# Patient Record
Sex: MALE | Race: WHITE | NOT HISPANIC OR LATINO | Employment: OTHER | URBAN - METROPOLITAN AREA
[De-identification: names, ages, dates, MRNs, and addresses within clinical notes are randomized per-mention and may not be internally consistent; named-entity substitution may affect disease eponyms.]

---

## 2017-01-01 ENCOUNTER — APPOINTMENT (OUTPATIENT)
Dept: RADIOLOGY | Facility: MEDICAL CENTER | Age: 62
DRG: 557 | End: 2017-01-01
Attending: EMERGENCY MEDICINE
Payer: MEDICARE

## 2017-01-01 ENCOUNTER — RESOLUTE PROFESSIONAL BILLING HOSPITAL PROF FEE (OUTPATIENT)
Dept: HOSPITALIST | Facility: MEDICAL CENTER | Age: 62
End: 2017-01-01
Payer: MEDICARE

## 2017-01-01 ENCOUNTER — HOSPITAL ENCOUNTER (INPATIENT)
Facility: MEDICAL CENTER | Age: 62
LOS: 4 days | DRG: 557 | End: 2017-01-05
Attending: EMERGENCY MEDICINE | Admitting: FAMILY MEDICINE
Payer: MEDICARE

## 2017-01-01 DIAGNOSIS — M62.82 NON-TRAUMATIC RHABDOMYOLYSIS: ICD-10-CM

## 2017-01-01 LAB
ALBUMIN SERPL BCP-MCNC: 3.9 G/DL (ref 3.2–4.9)
ALBUMIN/GLOB SERPL: 1.4 G/DL
ALP SERPL-CCNC: 59 U/L (ref 30–99)
ALT SERPL-CCNC: 50 U/L (ref 2–50)
ANION GAP SERPL CALC-SCNC: 8 MMOL/L (ref 0–11.9)
APPEARANCE UR: CLEAR
APTT PPP: 27 SEC (ref 24.7–36)
AST SERPL-CCNC: 119 U/L (ref 12–45)
BACTERIA #/AREA URNS HPF: ABNORMAL /HPF
BASOPHILS # BLD AUTO: 0.6 % (ref 0–1.8)
BASOPHILS # BLD: 0.04 K/UL (ref 0–0.12)
BILIRUB SERPL-MCNC: 1 MG/DL (ref 0.1–1.5)
BILIRUB UR QL STRIP.AUTO: NEGATIVE
BUN SERPL-MCNC: 15 MG/DL (ref 8–22)
CALCIUM SERPL-MCNC: 9.4 MG/DL (ref 8.5–10.5)
CHLORIDE SERPL-SCNC: 103 MMOL/L (ref 96–112)
CK SERPL-CCNC: 9093 U/L (ref 0–154)
CO2 SERPL-SCNC: 25 MMOL/L (ref 20–33)
COLOR UR: YELLOW
CREAT SERPL-MCNC: 0.71 MG/DL (ref 0.5–1.4)
EOSINOPHIL # BLD AUTO: 0.02 K/UL (ref 0–0.51)
EOSINOPHIL NFR BLD: 0.3 % (ref 0–6.9)
ERYTHROCYTE [DISTWIDTH] IN BLOOD BY AUTOMATED COUNT: 48 FL (ref 35.9–50)
GFR SERPL CREATININE-BSD FRML MDRD: >60 ML/MIN/1.73 M 2
GLOBULIN SER CALC-MCNC: 2.7 G/DL (ref 1.9–3.5)
GLUCOSE SERPL-MCNC: 93 MG/DL (ref 65–99)
GLUCOSE UR STRIP.AUTO-MCNC: NEGATIVE MG/DL
HCT VFR BLD AUTO: 38 % (ref 42–52)
HGB BLD-MCNC: 12.9 G/DL (ref 14–18)
HYALINE CASTS #/AREA URNS LPF: ABNORMAL /LPF
IMM GRANULOCYTES # BLD AUTO: 0.04 K/UL (ref 0–0.11)
IMM GRANULOCYTES NFR BLD AUTO: 0.6 % (ref 0–0.9)
INR PPP: 1.19 (ref 0.87–1.13)
KETONES UR STRIP.AUTO-MCNC: NEGATIVE MG/DL
LACTATE BLD-SCNC: 1.5 MMOL/L (ref 0.5–2)
LEUKOCYTE ESTERASE UR QL STRIP.AUTO: NEGATIVE
LYMPHOCYTES # BLD AUTO: 0.7 K/UL (ref 1–4.8)
LYMPHOCYTES NFR BLD: 10 % (ref 22–41)
MCH RBC QN AUTO: 31.4 PG (ref 27–33)
MCHC RBC AUTO-ENTMCNC: 33.9 G/DL (ref 33.7–35.3)
MCV RBC AUTO: 92.5 FL (ref 81.4–97.8)
MICRO URNS: ABNORMAL
MONOCYTES # BLD AUTO: 0.73 K/UL (ref 0–0.85)
MONOCYTES NFR BLD AUTO: 10.4 % (ref 0–13.4)
MUCOUS THREADS #/AREA URNS HPF: ABNORMAL /HPF
NEUTROPHILS # BLD AUTO: 5.48 K/UL (ref 1.82–7.42)
NEUTROPHILS NFR BLD: 78.1 % (ref 44–72)
NITRITE UR QL STRIP.AUTO: NEGATIVE
NRBC # BLD AUTO: 0 K/UL
NRBC BLD AUTO-RTO: 0 /100 WBC
PH UR STRIP.AUTO: 5.5 [PH]
PLATELET # BLD AUTO: 202 K/UL (ref 164–446)
PMV BLD AUTO: 8.9 FL (ref 9–12.9)
POTASSIUM SERPL-SCNC: 3.8 MMOL/L (ref 3.6–5.5)
PROT SERPL-MCNC: 6.6 G/DL (ref 6–8.2)
PROT UR QL STRIP: NEGATIVE MG/DL
PROTHROMBIN TIME: 15.5 SEC (ref 12–14.6)
RBC # BLD AUTO: 4.11 M/UL (ref 4.7–6.1)
RBC # URNS HPF: ABNORMAL /HPF
RBC UR QL AUTO: ABNORMAL
SODIUM SERPL-SCNC: 136 MMOL/L (ref 135–145)
SP GR UR STRIP.AUTO: 1.02
TROPONIN I SERPL-MCNC: 0.02 NG/ML (ref 0–0.04)
TSH SERPL DL<=0.005 MIU/L-ACNC: 0.49 UIU/ML (ref 0.3–3.7)
WBC # BLD AUTO: 7 K/UL (ref 4.8–10.8)
WBC #/AREA URNS HPF: ABNORMAL /HPF

## 2017-01-01 PROCEDURE — 84443 ASSAY THYROID STIM HORMONE: CPT

## 2017-01-01 PROCEDURE — 96361 HYDRATE IV INFUSION ADD-ON: CPT

## 2017-01-01 PROCEDURE — 81001 URINALYSIS AUTO W/SCOPE: CPT

## 2017-01-01 PROCEDURE — 72050 X-RAY EXAM NECK SPINE 4/5VWS: CPT

## 2017-01-01 PROCEDURE — A9270 NON-COVERED ITEM OR SERVICE: HCPCS | Performed by: FAMILY MEDICINE

## 2017-01-01 PROCEDURE — 99285 EMERGENCY DEPT VISIT HI MDM: CPT

## 2017-01-01 PROCEDURE — 700102 HCHG RX REV CODE 250 W/ 637 OVERRIDE(OP): Performed by: FAMILY MEDICINE

## 2017-01-01 PROCEDURE — 770006 HCHG ROOM/CARE - MED/SURG/GYN SEMI*

## 2017-01-01 PROCEDURE — 70450 CT HEAD/BRAIN W/O DYE: CPT

## 2017-01-01 PROCEDURE — 700111 HCHG RX REV CODE 636 W/ 250 OVERRIDE (IP): Performed by: FAMILY MEDICINE

## 2017-01-01 PROCEDURE — 80053 COMPREHEN METABOLIC PANEL: CPT

## 2017-01-01 PROCEDURE — 36415 COLL VENOUS BLD VENIPUNCTURE: CPT

## 2017-01-01 PROCEDURE — 73560 X-RAY EXAM OF KNEE 1 OR 2: CPT | Mod: RT

## 2017-01-01 PROCEDURE — 700111 HCHG RX REV CODE 636 W/ 250 OVERRIDE (IP): Performed by: EMERGENCY MEDICINE

## 2017-01-01 PROCEDURE — 71010 DX-CHEST-PORTABLE (1 VIEW): CPT

## 2017-01-01 PROCEDURE — 96375 TX/PRO/DX INJ NEW DRUG ADDON: CPT

## 2017-01-01 PROCEDURE — 96374 THER/PROPH/DIAG INJ IV PUSH: CPT

## 2017-01-01 PROCEDURE — 85730 THROMBOPLASTIN TIME PARTIAL: CPT

## 2017-01-01 PROCEDURE — 94760 N-INVAS EAR/PLS OXIMETRY 1: CPT

## 2017-01-01 PROCEDURE — 84484 ASSAY OF TROPONIN QUANT: CPT

## 2017-01-01 PROCEDURE — 85025 COMPLETE CBC W/AUTO DIFF WBC: CPT

## 2017-01-01 PROCEDURE — 82550 ASSAY OF CK (CPK): CPT

## 2017-01-01 PROCEDURE — 73501 X-RAY EXAM HIP UNI 1 VIEW: CPT | Mod: RT

## 2017-01-01 PROCEDURE — 83605 ASSAY OF LACTIC ACID: CPT

## 2017-01-01 PROCEDURE — 87086 URINE CULTURE/COLONY COUNT: CPT

## 2017-01-01 PROCEDURE — 93005 ELECTROCARDIOGRAM TRACING: CPT | Performed by: EMERGENCY MEDICINE

## 2017-01-01 PROCEDURE — 85610 PROTHROMBIN TIME: CPT

## 2017-01-01 PROCEDURE — 700105 HCHG RX REV CODE 258: Performed by: EMERGENCY MEDICINE

## 2017-01-01 PROCEDURE — 99223 1ST HOSP IP/OBS HIGH 75: CPT | Performed by: FAMILY MEDICINE

## 2017-01-01 RX ORDER — BETHANECHOL CHLORIDE 25 MG/1
25 TABLET ORAL 3 TIMES DAILY
Status: DISCONTINUED | OUTPATIENT
Start: 2017-01-01 | End: 2017-01-02

## 2017-01-01 RX ORDER — SODIUM CHLORIDE 9 MG/ML
INJECTION, SOLUTION INTRAVENOUS CONTINUOUS
Status: DISCONTINUED | OUTPATIENT
Start: 2017-01-01 | End: 2017-01-05 | Stop reason: HOSPADM

## 2017-01-01 RX ORDER — GABAPENTIN 600 MG/1
1200 TABLET ORAL 3 TIMES DAILY
COMMUNITY
End: 2017-05-04 | Stop reason: SDUPTHER

## 2017-01-01 RX ORDER — PROMETHAZINE HYDROCHLORIDE 25 MG/1
12.5-25 SUPPOSITORY RECTAL EVERY 4 HOURS PRN
Status: DISCONTINUED | OUTPATIENT
Start: 2017-01-01 | End: 2017-01-05 | Stop reason: HOSPADM

## 2017-01-01 RX ORDER — ASCORBIC ACID 500 MG
500 TABLET ORAL DAILY
Status: DISCONTINUED | OUTPATIENT
Start: 2017-01-02 | End: 2017-01-05 | Stop reason: HOSPADM

## 2017-01-01 RX ORDER — ARGININE HCL 1000 MG
1 TABLET ORAL DAILY
Status: DISCONTINUED | OUTPATIENT
Start: 2017-01-01 | End: 2017-01-01

## 2017-01-01 RX ORDER — LEVOTHYROXINE SODIUM 0.15 MG/1
150 TABLET ORAL
Status: DISCONTINUED | OUTPATIENT
Start: 2017-01-02 | End: 2017-01-04

## 2017-01-01 RX ORDER — GABAPENTIN 400 MG/1
1200 CAPSULE ORAL 3 TIMES DAILY
Status: DISCONTINUED | OUTPATIENT
Start: 2017-01-01 | End: 2017-01-02

## 2017-01-01 RX ORDER — ONDANSETRON 2 MG/ML
4 INJECTION INTRAMUSCULAR; INTRAVENOUS EVERY 4 HOURS PRN
Status: DISCONTINUED | OUTPATIENT
Start: 2017-01-01 | End: 2017-01-05 | Stop reason: HOSPADM

## 2017-01-01 RX ORDER — AMOXICILLIN 250 MG
1 CAPSULE ORAL NIGHTLY
Status: DISCONTINUED | OUTPATIENT
Start: 2017-01-02 | End: 2017-01-03

## 2017-01-01 RX ORDER — BISACODYL 10 MG
10 SUPPOSITORY, RECTAL RECTAL
Status: DISCONTINUED | OUTPATIENT
Start: 2017-01-02 | End: 2017-01-03

## 2017-01-01 RX ORDER — SODIUM CHLORIDE 9 MG/ML
1000 INJECTION, SOLUTION INTRAVENOUS ONCE
Status: COMPLETED | OUTPATIENT
Start: 2017-01-01 | End: 2017-01-01

## 2017-01-01 RX ORDER — LACTULOSE 20 G/30ML
30 SOLUTION ORAL
Status: DISCONTINUED | OUTPATIENT
Start: 2017-01-02 | End: 2017-01-03

## 2017-01-01 RX ORDER — SODIUM CHLORIDE 9 MG/ML
1000 INJECTION, SOLUTION INTRAVENOUS CONTINUOUS
Status: DISCONTINUED | OUTPATIENT
Start: 2017-01-01 | End: 2017-01-03

## 2017-01-01 RX ORDER — ENEMA 19; 7 G/133ML; G/133ML
1 ENEMA RECTAL
Status: DISCONTINUED | OUTPATIENT
Start: 2017-01-02 | End: 2017-01-03

## 2017-01-01 RX ORDER — ONDANSETRON 2 MG/ML
4 INJECTION INTRAMUSCULAR; INTRAVENOUS ONCE
Status: COMPLETED | OUTPATIENT
Start: 2017-01-01 | End: 2017-01-01

## 2017-01-01 RX ORDER — BACLOFEN 10 MG/1
20 TABLET ORAL 3 TIMES DAILY
Status: DISCONTINUED | OUTPATIENT
Start: 2017-01-01 | End: 2017-01-02

## 2017-01-01 RX ORDER — PROMETHAZINE HYDROCHLORIDE 25 MG/1
12.5-25 TABLET ORAL EVERY 4 HOURS PRN
Status: DISCONTINUED | OUTPATIENT
Start: 2017-01-01 | End: 2017-01-05 | Stop reason: HOSPADM

## 2017-01-01 RX ORDER — SODIUM CHLORIDE 9 MG/ML
1000 INJECTION, SOLUTION INTRAVENOUS
Status: COMPLETED | OUTPATIENT
Start: 2017-01-01 | End: 2017-01-01

## 2017-01-01 RX ORDER — DOCUSATE SODIUM 100 MG/1
100 CAPSULE, LIQUID FILLED ORAL EVERY MORNING
Status: DISCONTINUED | OUTPATIENT
Start: 2017-01-02 | End: 2017-01-03

## 2017-01-01 RX ORDER — ONDANSETRON 4 MG/1
4 TABLET, ORALLY DISINTEGRATING ORAL EVERY 4 HOURS PRN
Status: DISCONTINUED | OUTPATIENT
Start: 2017-01-01 | End: 2017-01-05 | Stop reason: HOSPADM

## 2017-01-01 RX ORDER — AMOXICILLIN 250 MG
1 CAPSULE ORAL
Status: DISCONTINUED | OUTPATIENT
Start: 2017-01-02 | End: 2017-01-03

## 2017-01-01 RX ORDER — HYDROCODONE BITARTRATE AND ACETAMINOPHEN 10; 325 MG/1; MG/1
3 TABLET ORAL EVERY 6 HOURS PRN
Status: ON HOLD | COMMUNITY
End: 2017-01-04

## 2017-01-01 RX ORDER — HYDROCODONE BITARTRATE AND ACETAMINOPHEN 10; 325 MG/1; MG/1
1-2 TABLET ORAL EVERY 4 HOURS PRN
Status: DISCONTINUED | OUTPATIENT
Start: 2017-01-01 | End: 2017-01-03

## 2017-01-01 RX ADMIN — HYDROMORPHONE HYDROCHLORIDE 1 MG: 1 INJECTION, SOLUTION INTRAMUSCULAR; INTRAVENOUS; SUBCUTANEOUS at 17:23

## 2017-01-01 RX ADMIN — SODIUM CHLORIDE 1000 ML: 9 INJECTION, SOLUTION INTRAVENOUS at 19:48

## 2017-01-01 RX ADMIN — BACLOFEN 20 MG: 10 TABLET ORAL at 21:55

## 2017-01-01 RX ADMIN — SODIUM CHLORIDE 1000 ML: 9 INJECTION, SOLUTION INTRAVENOUS at 17:23

## 2017-01-01 RX ADMIN — BETHANECHOL CHLORIDE 25 MG: 25 TABLET ORAL at 21:00

## 2017-01-01 RX ADMIN — ONDANSETRON 4 MG: 2 INJECTION, SOLUTION INTRAMUSCULAR; INTRAVENOUS at 17:23

## 2017-01-01 RX ADMIN — SODIUM CHLORIDE 1000 ML: 9 INJECTION, SOLUTION INTRAVENOUS at 19:35

## 2017-01-01 RX ADMIN — HYDROCODONE BITARTRATE AND ACETAMINOPHEN 2 TABLET: 10; 325 TABLET ORAL at 21:54

## 2017-01-01 RX ADMIN — SODIUM CHLORIDE 1000 ML: 9 INJECTION, SOLUTION INTRAVENOUS at 20:00

## 2017-01-01 RX ADMIN — SODIUM CHLORIDE 1000 ML: 9 INJECTION, SOLUTION INTRAVENOUS at 22:00

## 2017-01-01 RX ADMIN — GABAPENTIN 1200 MG: 400 CAPSULE ORAL at 21:55

## 2017-01-01 ASSESSMENT — PATIENT HEALTH QUESTIONNAIRE - PHQ9
SUM OF ALL RESPONSES TO PHQ QUESTIONS 1-9: 0
SUM OF ALL RESPONSES TO PHQ9 QUESTIONS 1 AND 2: 0
2. FEELING DOWN, DEPRESSED, IRRITABLE, OR HOPELESS: NOT AT ALL
1. LITTLE INTEREST OR PLEASURE IN DOING THINGS: NOT AT ALL

## 2017-01-01 ASSESSMENT — PAIN SCALES - GENERAL
PAINLEVEL_OUTOF10: 7
PAINLEVEL_OUTOF10: 10
PAINLEVEL_OUTOF10: 4

## 2017-01-01 ASSESSMENT — LIFESTYLE VARIABLES
ALCOHOL_USE: NO
EVER_SMOKED: NEVER

## 2017-01-01 NOTE — IP AVS SNAPSHOT
" After Visit Summary                                                                                                                  Name:Jesus Pina  Medical Record Number:[id number]  CSN: [id number]    YOB: 1955   Age: 61 y.o.  Sex: male  HT:1.753 m (5' 9.02\") WT: 83.9 kg (184 lb 15.5 oz)          Admit Date: 1/1/2017     Discharge Date:   Today's Date: 1/5/2017  Attending Doctor:  Juan Cuenca M.D.                  Allergies:  Erythromycin; Pcn; and Tdap            Discharge Instructions       1. Home health  2. Regular diet.  3. Gradual increase in activity.  4/. F/U c Dr. CAITY Keane (PCP) 2-3 wks and HH as to be arranged.        Rhabdomyolysis  Rhabdomyolysis is a condition that results when muscle cells break down and release substances into the blood that can damage the kidneys. It happens because of damage to the muscles that move bones (skeletal muscle). When you damage this type of muscle, substances inside of your muscle cells are released into your blood. This includes a certain protein called myoglobin.  Your kidneys must filter myoglobin from your blood. Large amounts of myoglobin can cause kidney damage or kidney failure. Other substances that are released by muscle cells may upset the balance of the minerals (electrolytes) in your blood. This makes your blood become too acidic (acidosis).  CAUSES  This condition is caused by muscle damage. Muscle damage often results from:  · Extreme overuse of the muscles.  · An injury that crushes or compresses a muscle.  · Use of illegal drugs, especially cocaine.  · Alcohol abuse.  Other possible causes include:  · Prescription medicines, such as statins, amphetamines, and opiates.  · Infections.  · Inherited muscle diseases.  · High fever.  · Heatstroke.  · Dehydration.  · Seizures.  · Surgery.  RISK FACTORS  This condition is more likely to develop in:  · People who have a family history of muscle disease.  · People who participate in extreme sports, " such as marathon running.  · People who have diabetes.  · Older people.  · People who abuse drugs or alcohol.  SYMPTOMS  Symptoms of this condition vary. Some people have very few symptoms, while others have many symptoms. The most common symptoms include:  · Muscle pain and swelling.  · Muscle weakness.  · Dark urine.  · Feeling weak and tired.  Other symptoms include:  · Nausea and vomiting.  · Fever.  · Pain in the abdomen.  · Joint pain.  Signs and symptoms of complications from rhabdomyolysis may include:  · Heart rhythm abnormalities (arrhythmias).  · Seizures.  · Reduced urine production because of kidney failure.  · Very low blood pressure (shock).  · Uncontrolled bleeding.  DIAGNOSIS  This condition may be diagnosed based on:  · Your symptoms and medical history.  · A physical exam.  · Blood tests to check for:  ¨ Muscle breakdown products in the blood (creatine kinase).  ¨ Myoglobin.  ¨ Acidosis.  ¨ Electrolyte imbalances.  · Urine tests to check for myoglobin.  You may also have other tests to check for causes of muscle damage and to check for complications.  TREATMENT  Treatment for this condition focuses on keeping up your fluid level, reversing acidosis, and protecting your kidneys. Treatment may include:  · Fluids and medicines given through an IV tube that is inserted into one of your veins.  · Medicines, such as:  ¨ Sodium bicarbonate to reduce acidosis.  ¨ Electrolytes to restore the balance of these minerals in your body.  · Hemodialysis. This treatment uses an artificial kidney machine to filter your blood while you recover. You may have this if other treatments are not helping.  HOME CARE INSTRUCTIONS  · Take medicines only as directed by your health care provider.  · Rest at home until your health care provider says that you can return to your normal activities.  · Drink enough fluid to keep your urine clear or pale yellow.  · Do not exercise with great energy and effort (strenuously). Ask your  health care provider what level of exercise is safe for you.  · Do not abuse drugs or alcohol. If you are struggling with drug or alcohol use, ask your health care provider for help.  · Keep all follow-up visits as directed by your health care provider. This is important.  SEEK MEDICAL CARE IF:  · You develop symptoms of rhabdomyolysis at home after treatment.  SEEK IMMEDIATE MEDICAL CARE IF:  · You have a seizure.  · You bleed easily or cannot control bleeding.  · You cannot make urine.  · You have chest pain.  · You have trouble breathing.     This information is not intended to replace advice given to you by your health care provider. Make sure you discuss any questions you have with your health care provider.     Document Released: 11/30/2005 Document Revised: 05/03/2016 Document Reviewed: 12/23/2015  [employer] Interactive Patient Education ©2016 [employer]      Discharge Instructions    Discharged to home by car with friend. Discharged via wheelchair, hospital escort: Yes.  Special equipment needed: Wheelchair    Be sure to schedule a follow-up appointment with your primary care doctor or any specialists as instructed.     Discharge Plan:   Diet Plan: Discussed  Activity Level: Discussed  Confirmed Follow up Appointment: Patient to Call and Schedule Appointment  Confirmed Symptoms Management: Discussed  Medication Reconciliation Updated: Yes  Influenza Vaccine Indication: Patient Refuses    I understand that a diet low in cholesterol, fat, and sodium is recommended for good health. Unless I have been given specific instructions below for another diet, I accept this instruction as my diet prescription.   Other diet: regular     Special Instructions: None    · Is patient discharged on Warfarin / Coumadin?   No     · Is patient Post Blood Transfusion?  No    Depression / Suicide Risk    As you are discharged from this [facility] facility, it is important to learn how to keep safe from harming  yourself.    Recognize the warning signs:  · Abrupt changes in personality, positive or negative- including increase in energy   · Giving away possessions  · Change in eating patterns- significant weight changes-  positive or negative  · Change in sleeping patterns- unable to sleep or sleeping all the time   · Unwillingness or inability to communicate  · Depression  · Unusual sadness, discouragement and loneliness  · Talk of wanting to die  · Neglect of personal appearance   · Rebelliousness- reckless behavior  · Withdrawal from people/activities they love  · Confusion- inability to concentrate     If you or a loved one observes any of these behaviors or has concerns about self-harm, here's what you can do:  · Talk about it- your feelings and reasons for harming yourself  · Remove any means that you might use to hurt yourself (examples: pills, rope, extension cords, firearm)  · Get professional help from the community (Mental Health, Substance Abuse, psychological counseling)  · Do not be alone:Call your Safe Contact- someone whom you trust who will be there for you.  · Call your local CRISIS HOTLINE 926-0975 or 690-210-3262  · Call your local Children's Mobile Crisis Response Team [place] (944) 930-2793 or www.[employer]  · Call the toll free National Suicide Prevention Hotlines   · National Suicide Prevention Lifeline 875-501-OMNQ (3375)  · National Hope Line Network 800-SUICIDE (113-7740)        Follow-up Information     1. Follow up with AALIYAH Dewey. Schedule an appointment as soon as possible for a visit in 2 weeks.    Specialty:  Family Medicine    Why:  Follow Up    Contact information    Tiffany [address] [id number] [phone]           Discharge Medication Instructions:    Below are the medications your physician expects you to take upon discharge:    Review all your home medications and newly ordered medications with your doctor and/or pharmacist. Follow medication  instructions as directed by your doctor and/or pharmacist.    Please keep your medication list with you and share with your physician.               Medication List      START taking these medications        Instructions    hydrALAZINE 25 MG Tabs   Last time this was given:  25 mg on 1/5/2017  7:47 AM   Commonly known as:  APRESOLINE    Take 1 Tab by mouth every 8 hours.   Dose:  25 mg         CHANGE how you take these medications        Instructions    hydrocodone/acetaminophen  MG Tabs   What changed:  how much to take   Last time this was given:  2 Tabs on 1/5/2017 11:01 AM   Commonly known as:  NORCO    Take 1-2 Tabs by mouth every 6 hours as needed for Severe Pain.   Dose:  1-2 Tab       levothyroxine 100 MCG Tabs   What changed:    - medication strength  - how much to take   Last time this was given:  100 mcg on 1/5/2017  5:23 AM   Commonly known as:  SYNTHROID    Take 1 Tab by mouth Every morning on an empty stomach.   Dose:  100 mcg         CONTINUE taking these medications        Instructions    ascorbic acid 500 MG Tabs   Last time this was given:  500 mg on 1/5/2017  7:48 AM   Commonly known as:  ascorbic acid    Take 500 mg by mouth every day.   Dose:  500 mg       baclofen 10 MG Tabs   Last time this was given:  20 mg on 1/5/2017 11:39 AM   Commonly known as:  LIORESAL    Take 20 mg by mouth 3 times a day.   Dose:  20 mg       bethanechol 25 MG Tabs   Last time this was given:  25 mg on 1/5/2017 11:39 AM   Commonly known as:  URECHOLINE    Take 25 mg by mouth 3 times a day.   Dose:  25 mg       CENTRUM SILVER ADULT 50+ PO    Take 1 Tab by mouth every day.   Dose:  1 Tab       EQL MILK THISTLE 175 MG Caps   Generic drug:  Milk Thistle    Take 1 Cap by mouth every day.   Dose:  1 Cap       FISH OIL-KRILL OIL PO    Take 1 Cap by mouth every day at 6 PM.   Dose:  1 Cap       gabapentin 600 MG tablet   Commonly known as:  NEURONTIN    Take 1,200 mg by mouth 3 times a day.   Dose:  1200 mg        L-Arginine 1000 MG Tabs    Take 1 Cap by mouth every day.   Dose:  1 Cap       Nettle-Pygeum africanum 300-25 MG Caps    Take 1 Cap by mouth every day.   Dose:  1 Cap       STOOL SOFTENER 100 MG Caps   Generic drug:  docusate sodium    Take 100 mg by mouth 2 times a day.   Dose:  100 mg       terazosin 10 MG capsule   Last time this was given:  10 mg on 1/4/2017 10:03 PM   Commonly known as:  HYTRIN    Take 10 mg by mouth every day.   Dose:  10 mg               Instructions           Diet / Nutrition:    Follow any diet instructions given to you by your doctor or the dietician, including how much salt (sodium) you are allowed each day.    If you are overweight, talk to your doctor about a weight reduction plan.    Activity:    Remain physically active following your doctor's instructions about exercise and activity.    Rest often.     Any time you become even a little tired or short of breath, SIT DOWN and rest.    Worsening Symptoms:    Report any of the following signs and symptoms to the doctor's office immediately:    *Pain of jaw, arm, or neck  *Chest pain not relieved by medication                               *Dizziness or loss of consciousness  *Difficulty breathing even when at rest   *More tired than usual                                       *Bleeding drainage or swelling of surgical site  *Swelling of feet, ankles, legs or stomach                 *Fever (>100ºF)  *Pink or blood tinged sputum  *Weight gain (3lbs/day or 5lbs /week)           *Shock from internal defibrillator (if applicable)  *Palpitations or irregular heartbeats                *Cool and/or numb extremities    Stroke Awareness    Common Risk Factors for Stroke include:    Age  Atrial Fibrillation  Carotid Artery Stenosis  Diabetes Mellitus  Excessive alcohol consumption  High blood pressure  Overweight   Physical inactivity  Smoking    Warning signs and symptoms of a stroke include:    *Sudden numbness or weakness of the face, arm or leg  (especially on one side of the body).  *Sudden confusion, trouble speaking or understanding.  *Sudden trouble seeing in one or both eyes.  *Sudden trouble walking, dizziness, loss of balance or coordination.Sudden severe headache with no known cause.    It is very important to get treatment quickly when a stroke occurs. If you experience any of the above warning signs, call 911 immediately.                   Disclaimer         Quit Smoking / Tobacco Use:    I understand the use of any tobacco products increases my chance of suffering from future heart disease or stroke and could cause other illnesses which may shorten my life. Quitting the use of tobacco products is the single most important thing I can do to improve my health. For further information on smoking / tobacco cessation call a Toll Free Quit Line at 1-383.383.1723 (*National Cancer [place]) or 1-394.917.5285 (American Lung Association) or you can access the web based program at www.lungusa.org.    [place] Tobacco Users Help Line:  (454) 985-6463       Toll Free: 1-659.253.8946  Quit Tobacco Program [facility] (776)397-5977    Crisis Hotline:    [place] Crisis Hotline:  5-921-THKRIKZ or 1-744.586.2114    [place] Crisis Hotline:    1-482.581.7006 or 007-109-1626    Discharge Survey:   Thank you for choosing [facility]. We hope we did everything we could to make your hospital stay a pleasant one. You may be receiving a phone survey and we would appreciate your time and participation in answering the questions. Your input is very valuable to us in our efforts to improve our service to our patients and their families.        My signature on this form indicates that:    1. I have reviewed and understand the above information.  2. My questions regarding this information have been answered to my satisfaction.  3. I have formulated a plan with my discharge nurse to obtain my prescribed medications for home.                  Disclaimer          __________________________________                     __________       ________                       Patient Signature                                                 Date                    Time

## 2017-01-01 NOTE — IP AVS SNAPSHOT
[employer] Access Code: Activation code not generated  Current [employer] Status: Patient Declined    Your email address is not on file at [facility].  Email Addresses are required for you to sign up for [employer], please contact [phone] to verify your personal information and to provide your email address prior to attempting to register for [employer].    Jesus Sparrowssup  [address] [place] [place] [address]    [employer]  A secure, online tool to manage your health information     [facility]’s [employer]® is a secure, online tool that connects you to your personalized health information from the privacy of your home -- day or night - making it very easy for you to manage your healthcare. Once the activation process is completed, you can even access your medical information using the [employer] kina, which is available for free in the Apple Kina store or Google Play store.     To learn more about [employer], visit [email or web address]    There are two levels of access available (as shown below):   My Chart Features  [facility] Primary Care Doctor [facility]  Specialists [facility]  Urgent  Care Non-[facility] Primary Care Doctor   Email your healthcare team securely and privately 24/7 X X X    Manage appointments: schedule your next appointment; view details of past/upcoming appointments X      Request prescription refills. X      View recent personal medical records, including lab and immunizations X X X X   View health record, including health history, allergies, medications X X X X   Read reports about your outpatient visits, procedures, consult and ER notes X X X X   See your discharge summary, which is a recap of your hospital and/or ER visit that includes your diagnosis, lab results, and care plan X X  X     How to register for [employer]:  Once your e-mail address has been verified, follow the following steps to sign up for [employer].     1. Go to  [email or web address]  2. Click on the Sign Up Now box, which takes you to the New Member  Sign Up page. You will need to provide the following information:  a. Enter your [employer] Access Code exactly as it appears at the top of this page. (You will not need to use this code after you’ve completed the sign-up process. If you do not sign up before the expiration date, you must request a new code.)   b. Enter your date of birth.   c. Enter your home email address.   d. Click Submit, and follow the next screen’s instructions.  3. Create a [employer] ID. This will be your [employer] login ID and cannot be changed, so think of one that is secure and easy to remember.  4. Create a [employer] password. You can change your password at any time.  5. Enter your Password Reset Question and Answer. This can be used at a later time if you forget your password.   6. Enter your e-mail address. This allows you to receive e-mail notifications when new information is available in [employer].  7. Click Sign Up. You can now view your health information.    For assistance activating your [employer] account, call [phone]

## 2017-01-01 NOTE — IP AVS SNAPSHOT
" <p align=\"LEFT\"><IMG SRC=\"//[email or web address]\" alt=\"Image\" WIDTH=\"50%\" HEIGHT=\"200\" BORDER=\"\"></p>                   Name:Jesus Pina  Medical Record Number:[id number]  CSN: [id number]    YOB: 1955   Age: 61 y.o.  Sex: male  HT:1.753 m (5' 9.02\") WT: 83.9 kg (184 lb 15.5 oz)          Admit Date: 1/1/2017     Discharge Date:   Today's Date: 1/5/2017  Attending Doctor:  Juan Cuenca M.D.                  Allergies:  Erythromycin; Pcn; and Tdap          Follow-up Information     1. Follow up with AALIYAH Dewey. Schedule an appointment as soon as possible for a visit in 2 weeks.    Specialty:  Family Medicine    Why:  Follow Up    Contact information    [address] [id number] [phone]           Medication List      Take these Medications        Instructions    ascorbic acid 500 MG Tabs   Commonly known as:  ascorbic acid    Take 500 mg by mouth every day.   Dose:  500 mg       baclofen 10 MG Tabs   Commonly known as:  LIORESAL    Take 20 mg by mouth 3 times a day.   Dose:  20 mg       bethanechol 25 MG Tabs   Commonly known as:  URECHOLINE    Take 25 mg by mouth 3 times a day.   Dose:  25 mg       CENTRUM SILVER ADULT 50+ PO    Take 1 Tab by mouth every day.   Dose:  1 Tab       EQL MILK THISTLE 175 MG Caps   Generic drug:  Milk Thistle    Take 1 Cap by mouth every day.   Dose:  1 Cap       FISH OIL-KRILL OIL PO    Take 1 Cap by mouth every day at 6 PM.   Dose:  1 Cap       gabapentin 600 MG tablet   Commonly known as:  NEURONTIN    Take 1,200 mg by mouth 3 times a day.   Dose:  1200 mg       hydrALAZINE 25 MG Tabs   Commonly known as:  APRESOLINE    Take 1 Tab by mouth every 8 hours.   Dose:  25 mg       hydrocodone/acetaminophen  MG Tabs   What changed:  how much to take   Commonly known as:  NORCO    Take 1-2 Tabs by mouth every 6 hours as needed for Severe Pain.   Dose:  1-2 Tab       L-Arginine 1000 MG Tabs    Take 1 Cap by mouth every day.   Dose:  1 Cap  "       levothyroxine 100 MCG Tabs   What changed:    - medication strength  - how much to take   Commonly known as:  SYNTHROID    Take 1 Tab by mouth Every morning on an empty stomach.   Dose:  100 mcg       Nettle-Pygeum africanum 300-25 MG Caps    Take 1 Cap by mouth every day.   Dose:  1 Cap       STOOL SOFTENER 100 MG Caps   Generic drug:  docusate sodium    Take 100 mg by mouth 2 times a day.   Dose:  100 mg       terazosin 10 MG capsule   Commonly known as:  HYTRIN    Take 10 mg by mouth every day.   Dose:  10 mg

## 2017-01-01 NOTE — IP AVS SNAPSHOT
1/5/2017          Jesus Pina  [address]  [facility] [address]    Dear Jesus:    [facility] wants to ensure your discharge home is safe and you or your loved ones have had all your questions answered regarding your care after you leave the hospital.    You may receive a telephone call within two days of your discharge.  This call is to make certain you understand your discharge instructions as well as ensure we provided you with the best care possible during your stay with us.     The call will only last approximately 3-5 minutes and will be done by a nurse.    Once again, we want to ensure your discharge home is safe and that you have a clear understanding of any next steps in your care.  If you have any questions or concerns, please do not hesitate to contact us, we are here for you.  Thank you for choosing [facility] for your healthcare needs.    Sincerely,    Nguyễn Juan    [facility]

## 2017-01-02 PROBLEM — L89.90 DECUBITAL ULCER: Status: ACTIVE | Noted: 2017-01-02

## 2017-01-02 PROBLEM — I95.9 HYPOTENSION: Status: ACTIVE | Noted: 2017-01-02

## 2017-01-02 LAB
ALBUMIN SERPL BCP-MCNC: 3.2 G/DL (ref 3.2–4.9)
ALBUMIN/GLOB SERPL: 1.4 G/DL
ALP SERPL-CCNC: 51 U/L (ref 30–99)
ALT SERPL-CCNC: 48 U/L (ref 2–50)
ANION GAP SERPL CALC-SCNC: 4 MMOL/L (ref 0–11.9)
AST SERPL-CCNC: 111 U/L (ref 12–45)
BASOPHILS # BLD AUTO: 0.4 % (ref 0–1.8)
BASOPHILS # BLD: 0.02 K/UL (ref 0–0.12)
BILIRUB SERPL-MCNC: 0.6 MG/DL (ref 0.1–1.5)
BUN SERPL-MCNC: 15 MG/DL (ref 8–22)
CALCIUM SERPL-MCNC: 8.4 MG/DL (ref 8.5–10.5)
CHLORIDE SERPL-SCNC: 108 MMOL/L (ref 96–112)
CK SERPL-CCNC: 7609 U/L (ref 0–154)
CO2 SERPL-SCNC: 23 MMOL/L (ref 20–33)
CREAT SERPL-MCNC: 0.73 MG/DL (ref 0.5–1.4)
EOSINOPHIL # BLD AUTO: 0.07 K/UL (ref 0–0.51)
EOSINOPHIL NFR BLD: 1.3 % (ref 0–6.9)
ERYTHROCYTE [DISTWIDTH] IN BLOOD BY AUTOMATED COUNT: 50.8 FL (ref 35.9–50)
GFR SERPL CREATININE-BSD FRML MDRD: >60 ML/MIN/1.73 M 2
GLOBULIN SER CALC-MCNC: 2.3 G/DL (ref 1.9–3.5)
GLUCOSE SERPL-MCNC: 94 MG/DL (ref 65–99)
HCT VFR BLD AUTO: 34.9 % (ref 42–52)
HGB BLD-MCNC: 11.2 G/DL (ref 14–18)
IMM GRANULOCYTES # BLD AUTO: 0.02 K/UL (ref 0–0.11)
IMM GRANULOCYTES NFR BLD AUTO: 0.4 % (ref 0–0.9)
LYMPHOCYTES # BLD AUTO: 0.94 K/UL (ref 1–4.8)
LYMPHOCYTES NFR BLD: 17.4 % (ref 22–41)
MCH RBC QN AUTO: 30.6 PG (ref 27–33)
MCHC RBC AUTO-ENTMCNC: 32.1 G/DL (ref 33.7–35.3)
MCV RBC AUTO: 95.4 FL (ref 81.4–97.8)
MONOCYTES # BLD AUTO: 0.69 K/UL (ref 0–0.85)
MONOCYTES NFR BLD AUTO: 12.8 % (ref 0–13.4)
NEUTROPHILS # BLD AUTO: 3.67 K/UL (ref 1.82–7.42)
NEUTROPHILS NFR BLD: 67.7 % (ref 44–72)
NRBC # BLD AUTO: 0 K/UL
NRBC BLD AUTO-RTO: 0 /100 WBC
PLATELET # BLD AUTO: 169 K/UL (ref 164–446)
PMV BLD AUTO: 8.8 FL (ref 9–12.9)
POTASSIUM SERPL-SCNC: 4 MMOL/L (ref 3.6–5.5)
PROT SERPL-MCNC: 5.5 G/DL (ref 6–8.2)
RBC # BLD AUTO: 3.66 M/UL (ref 4.7–6.1)
SODIUM SERPL-SCNC: 135 MMOL/L (ref 135–145)
WBC # BLD AUTO: 5.4 K/UL (ref 4.8–10.8)

## 2017-01-02 PROCEDURE — 700111 HCHG RX REV CODE 636 W/ 250 OVERRIDE (IP)

## 2017-01-02 PROCEDURE — 99357 PR PROLONGED SERV,INPATIENT,EA ADD 1/2: CPT | Performed by: INTERNAL MEDICINE

## 2017-01-02 PROCEDURE — 302146: Performed by: INTERNAL MEDICINE

## 2017-01-02 PROCEDURE — 700102 HCHG RX REV CODE 250 W/ 637 OVERRIDE(OP): Performed by: FAMILY MEDICINE

## 2017-01-02 PROCEDURE — 82550 ASSAY OF CK (CPK): CPT

## 2017-01-02 PROCEDURE — 99356 PR PROLONGED SVC I/P OR OBS SETTING 1ST HOUR: CPT | Performed by: INTERNAL MEDICINE

## 2017-01-02 PROCEDURE — 700111 HCHG RX REV CODE 636 W/ 250 OVERRIDE (IP): Performed by: INTERNAL MEDICINE

## 2017-01-02 PROCEDURE — 700102 HCHG RX REV CODE 250 W/ 637 OVERRIDE(OP): Performed by: INTERNAL MEDICINE

## 2017-01-02 PROCEDURE — 770006 HCHG ROOM/CARE - MED/SURG/GYN SEMI*

## 2017-01-02 PROCEDURE — 36415 COLL VENOUS BLD VENIPUNCTURE: CPT

## 2017-01-02 PROCEDURE — 700105 HCHG RX REV CODE 258: Performed by: FAMILY MEDICINE

## 2017-01-02 PROCEDURE — 80053 COMPREHEN METABOLIC PANEL: CPT

## 2017-01-02 PROCEDURE — 85025 COMPLETE CBC W/AUTO DIFF WBC: CPT

## 2017-01-02 PROCEDURE — A9270 NON-COVERED ITEM OR SERVICE: HCPCS | Performed by: INTERNAL MEDICINE

## 2017-01-02 PROCEDURE — A9270 NON-COVERED ITEM OR SERVICE: HCPCS | Performed by: FAMILY MEDICINE

## 2017-01-02 PROCEDURE — 99233 SBSQ HOSP IP/OBS HIGH 50: CPT | Performed by: INTERNAL MEDICINE

## 2017-01-02 PROCEDURE — 700105 HCHG RX REV CODE 258: Performed by: EMERGENCY MEDICINE

## 2017-01-02 RX ORDER — GABAPENTIN 400 MG/1
1200 CAPSULE ORAL EVERY 6 HOURS
Status: DISCONTINUED | OUTPATIENT
Start: 2017-01-02 | End: 2017-01-05 | Stop reason: HOSPADM

## 2017-01-02 RX ORDER — MORPHINE SULFATE 4 MG/ML
0.5 INJECTION, SOLUTION INTRAMUSCULAR; INTRAVENOUS EVERY 6 HOURS PRN
Status: DISCONTINUED | OUTPATIENT
Start: 2017-01-02 | End: 2017-01-03

## 2017-01-02 RX ORDER — SODIUM CHLORIDE 9 MG/ML
500 INJECTION, SOLUTION INTRAVENOUS ONCE
Status: COMPLETED | OUTPATIENT
Start: 2017-01-02 | End: 2017-01-02

## 2017-01-02 RX ORDER — BACLOFEN 10 MG/1
20 TABLET ORAL EVERY 6 HOURS
Status: DISCONTINUED | OUTPATIENT
Start: 2017-01-02 | End: 2017-01-05 | Stop reason: HOSPADM

## 2017-01-02 RX ORDER — BETHANECHOL CHLORIDE 25 MG/1
25 TABLET ORAL EVERY 6 HOURS
Status: DISCONTINUED | OUTPATIENT
Start: 2017-01-02 | End: 2017-01-05 | Stop reason: HOSPADM

## 2017-01-02 RX ORDER — MORPHINE SULFATE 4 MG/ML
0.5 INJECTION, SOLUTION INTRAMUSCULAR; INTRAVENOUS ONCE
Status: COMPLETED | OUTPATIENT
Start: 2017-01-02 | End: 2017-01-02

## 2017-01-02 RX ORDER — MORPHINE SULFATE 4 MG/ML
INJECTION, SOLUTION INTRAMUSCULAR; INTRAVENOUS
Status: COMPLETED
Start: 2017-01-02 | End: 2017-01-02

## 2017-01-02 RX ADMIN — BACLOFEN 20 MG: 10 TABLET ORAL at 18:10

## 2017-01-02 RX ADMIN — SODIUM CHLORIDE: 9 INJECTION, SOLUTION INTRAVENOUS at 08:50

## 2017-01-02 RX ADMIN — BETHANECHOL CHLORIDE 25 MG: 25 TABLET ORAL at 18:10

## 2017-01-02 RX ADMIN — BETHANECHOL CHLORIDE 25 MG: 25 TABLET ORAL at 07:23

## 2017-01-02 RX ADMIN — MORPHINE SULFATE: 4 INJECTION INTRAVENOUS at 10:45

## 2017-01-02 RX ADMIN — GABAPENTIN 1200 MG: 400 CAPSULE ORAL at 18:09

## 2017-01-02 RX ADMIN — HYDROCODONE BITARTRATE AND ACETAMINOPHEN 2 TABLET: 10; 325 TABLET ORAL at 02:32

## 2017-01-02 RX ADMIN — GABAPENTIN 1200 MG: 400 CAPSULE ORAL at 12:58

## 2017-01-02 RX ADMIN — BETHANECHOL CHLORIDE 25 MG: 25 TABLET ORAL at 12:58

## 2017-01-02 RX ADMIN — SODIUM CHLORIDE: 9 INJECTION, SOLUTION INTRAVENOUS at 22:37

## 2017-01-02 RX ADMIN — LEVOTHYROXINE SODIUM 150 MCG: 150 TABLET ORAL at 06:48

## 2017-01-02 RX ADMIN — MORPHINE SULFATE 0.5 MG: 4 INJECTION INTRAVENOUS at 18:03

## 2017-01-02 RX ADMIN — SODIUM CHLORIDE 1000 ML: 9 INJECTION, SOLUTION INTRAVENOUS at 12:59

## 2017-01-02 RX ADMIN — GABAPENTIN 1200 MG: 400 CAPSULE ORAL at 07:22

## 2017-01-02 RX ADMIN — BACLOFEN 20 MG: 10 TABLET ORAL at 07:23

## 2017-01-02 RX ADMIN — HYDROCODONE BITARTRATE AND ACETAMINOPHEN 2 TABLET: 10; 325 TABLET ORAL at 20:13

## 2017-01-02 RX ADMIN — HYDROCODONE BITARTRATE AND ACETAMINOPHEN 2 TABLET: 10; 325 TABLET ORAL at 06:56

## 2017-01-02 RX ADMIN — HYDROCODONE BITARTRATE AND ACETAMINOPHEN 2 TABLET: 10; 325 TABLET ORAL at 13:28

## 2017-01-02 RX ADMIN — BACLOFEN 20 MG: 10 TABLET ORAL at 12:58

## 2017-01-02 RX ADMIN — OXYCODONE HYDROCHLORIDE AND ACETAMINOPHEN 500 MG: 500 TABLET ORAL at 07:23

## 2017-01-02 RX ADMIN — SODIUM CHLORIDE 500 ML: 9 INJECTION, SOLUTION INTRAVENOUS at 12:00

## 2017-01-02 ASSESSMENT — ENCOUNTER SYMPTOMS
NERVOUS/ANXIOUS: 1
CHILLS: 0
SEIZURES: 0
MYALGIAS: 1
HEADACHES: 0
DIAPHORESIS: 0
DIZZINESS: 0
DEPRESSION: 1
BACK PAIN: 0
PALPITATIONS: 0
BLURRED VISION: 0
SPEECH CHANGE: 0
ABDOMINAL PAIN: 0
DOUBLE VISION: 0
TREMORS: 0
SENSORY CHANGE: 0
COUGH: 0
CONSTIPATION: 0
MEMORY LOSS: 0
SHORTNESS OF BREATH: 0
TINGLING: 0
VOMITING: 0
NAUSEA: 0
FOCAL WEAKNESS: 1
FEVER: 0
FLANK PAIN: 0
DIARRHEA: 0
WEAKNESS: 1

## 2017-01-02 ASSESSMENT — PAIN SCALES - GENERAL
PAINLEVEL_OUTOF10: 10
PAINLEVEL_OUTOF10: 10

## 2017-01-02 ASSESSMENT — LIFESTYLE VARIABLES: DO YOU DRINK ALCOHOL: NO

## 2017-01-02 NOTE — ED NOTES
Baylee from Lab called with critical result of CPK 9093 at 1830. Critical lab result read back to Baylee.   Dr. Carrillo notified of critical lab result at 1832.  Critical lab result read back by Dr. Carrillo.

## 2017-01-02 NOTE — CARE PLAN
Problem: Safety  Goal: Will remain free from injury  Intervention: Provide assistance with mobility  Patient is quad and is unable to use call light for assistance. Patient is able to call out for assistance if needed. RN and/or CNA will need to assess needs and turn Q2H to prevent further skin breakdown.

## 2017-01-02 NOTE — PROGRESS NOTES
"Fady from Lab called with critical result of TCK 7609 at 0515 Critical lab result read back to Fady Moreau notified of critical lab result at 0540.  Critical lab result read back by Dr. Moreau. MD stated \"next time, read the previous results before calling me on critical labs, don't call me if results are trending down.\"  "

## 2017-01-02 NOTE — DIETARY
[facility] - Alert received for newly identified wound. Wound team consult pending, will await wound staging to make recommendations.     RD will monitor per dept policy.

## 2017-01-02 NOTE — ED NOTES
Pt arrived via ems, per ems pt fell last night getting caught between the dresser and scooter for around 12 hrs. This afternoon pts helper came over and helped him walk around. Pt fell for a 2nd time this afternoon and called 911. U/a ptsitting up caox4 speaking in full sentences no distress, no sob. No cp, no abd pain, pt c/o head pain, bilateral hip pain,abrasions noted to bilateral hip where pt was caught between dresser and pt was trying to maneuver out.

## 2017-01-02 NOTE — CARE PLAN
Problem: Pain Management  Goal: Pain level will decrease to patient’s comfort goal  Intervention: Follow pain managment plan developed in collaboration with patient and Interdisciplinary Team  RN instructed patient on pain scale rating of 0/10 when expressing pain. RN informed patient he has PRN pain medications ordered Q4H and will need to request medication if pain becomes unbearable. Patient stated he will inform RN when he needs pain medications. RN will continue to assess pain throughout shift.

## 2017-01-02 NOTE — ED PROVIDER NOTES
ER Provider Note     Scribed for Izaiah Carrillo, * by Karolyn Cooper. 1/1/2017, 5:10 PM.    Primary Care Provider: AALIYAH Dewey  Means of Arrival: ambulance   History obtained from: Patient  History limited by: none     CHIEF COMPLAINT   Chief Complaint   Patient presents with   • Fall       HPI   Jesus Pina is a 61 y.o. who presents to the emergency department for a fall. The patient tells me he fell last night at approx 1930 hrs while trying to get into his bed from his scooter. He was stuck on the ground until 0800 this morning when his friend came to check on him. He then had a second fall this afternoon and called 911. He is concerned why he has fallen twice today. The patient tells me he is a high functioning quadriplegic. The patient states he does not smoke or drink. He tells me he takes Gabapentin daily.     REVIEW OF SYSTEMS   Muskuloskeletal: Left knee pain  Dermatologic: Multiple abrasions.  Neurologic: Decreased movement secondary to partial paralysis.   All other systems reviewed and are negative    PAST MEDICAL HISTORY  Past Medical History   Diagnosis Date   • Thyroid disease    • BPH (benign prostatic hyperplasia)    • Neck fracture (HCC)    • Heart attack (HCC)      age early 40s    • Muscle disorder      high functioning quadraplegic        SURGICAL HISTORY  Past Surgical History   Procedure Laterality Date   • Laminotomy       repair neck fracture: total of 3 surgeries    • Full thickness skin graft  2000     foot       SOCIAL HISTORY  Social History   Substance Use Topics   • Smoking status: Never Smoker    • Smokeless tobacco: Never Used   • Alcohol Use: No       CURRENT MEDICATIONS  Previous Medications    ASCORBIC ACID (ASCORBIC ACID) 500 MG TAB    Take 500 mg by mouth every day.    BACLOFEN (LIORESAL) 10 MG TAB    Take 20 mg by mouth 3 times a day.    BETHANECHOL (URECHOLINE) 25 MG TAB    Take 25 mg by mouth 3 times a day.    DOCUSATE SODIUM (STOOL SOFTENER) 100 MG CAP     "Take 100 mg by mouth 2 times a day.    FISH OIL-KRILL OIL PO    Take 1 Cap by mouth every day at 6 PM.    GABAPENTIN (NEURONTIN) 600 MG TABLET    Take 1,200 mg by mouth 3 times a day.    HYDROCODONE/ACETAMINOPHEN (NORCO)  MG TAB    Take 3 Tabs by mouth every 6 hours as needed for Severe Pain.    L-ARGININE 1000 MG TAB    Take 1 Cap by mouth every day.    LEVOTHYROXINE (SYNTHROID) 150 MCG TAB    Take 150 mcg by mouth Every morning on an empty stomach.    MILK THISTLE (EQL MILK THISTLE) 175 MG CAP    Take 1 Cap by mouth every day.    MULTIPLE VITAMINS-MINERALS (CENTRUM SILVER ADULT 50+ PO)    Take 1 Tab by mouth every day.    NETTLE-PYGEUM AFRICANUM 300-25 MG CAP    Take 1 Cap by mouth every day.    TERAZOSIN (HYTRIN) 10 MG CAPSULE    Take 10 mg by mouth every day.       ALLERGIES     Allergies   Allergen Reactions   • Erythromycin Unspecified     \"It chokes me\"   • Pcn [Penicillins] Rash     Develops a rash with some PCN derivatives.    • Tdap [Dipth, Acell Pertus, Tetanus] Swelling     Neck Swelling       PHYSICAL EXAM   Vital Signs: BP 94/53 mmHg  Pulse 69  Temp(Src) 37 °C (98.6 °F)  Resp 18  Ht 1.753 m (5' 9\")  Wt 83.915 kg (185 lb)  BMI 27.31 kg/m2    Constitutional: Well developed, Well nourished, No acute distress, Non-toxic appearance.   Psychiatric: Calm. Not anxious.  HENT:  Abrasion on right forehead. Oropharynx: no exudate no erythema  Eyes: PERRLA, EOMI, Conjunctiva normal, No discharge.   Musculoskeletal: Minimal swelling in superior right knee. No crepitus, grossly intact. Minimal erythema on left knee. Neck is soft and supple no meningismus  Lymphatic: No cervical lymphadenopathy noted.   Cardiovascular: Normal heart rate, Normal rhythm, No murmurs, Negative Homans, no pedal edema, good equal pedal pulses.  Pulmonary: Lungs are clear to auscultation bilaterally. No wheezes rales or rhonchi  Abdomen: Bowel sounds normal, soft nondistended and nontender. No rebound and no guarding .  Skin: " abrasion on left forearm, left wrist, right shoulder, right hip, right thigh, and left hip. Old healing wounds noted on right thigh. Stage one on left buttock Warm, Dry, No rash.   : No CVA tenderness.   Neurologic: Alert & oriented. Contractures of hands bilaterally. Some strength in the proximal arms.     DIAGNOSTIC STUDIES/PROCEDURES  Labs:   Results for orders placed or performed during the hospital encounter of 01/01/17   CBC WITH DIFFERENTIAL   Result Value Ref Range    WBC 7.0 4.8 - 10.8 K/uL    RBC 4.11 (L) 4.70 - 6.10 M/uL    Hemoglobin 12.9 (L) 14.0 - 18.0 g/dL    Hematocrit 38.0 (L) 42.0 - 52.0 %    MCV 92.5 81.4 - 97.8 fL    MCH 31.4 27.0 - 33.0 pg    MCHC 33.9 33.7 - 35.3 g/dL    RDW 48.0 35.9 - 50.0 fL    Platelet Count 202 164 - 446 K/uL    MPV 8.9 (L) 9.0 - 12.9 fL    Neutrophils-Polys 78.10 (H) 44.00 - 72.00 %    Lymphocytes 10.00 (L) 22.00 - 41.00 %    Monocytes 10.40 0.00 - 13.40 %    Eosinophils 0.30 0.00 - 6.90 %    Basophils 0.60 0.00 - 1.80 %    Immature Granulocytes 0.60 0.00 - 0.90 %    Nucleated RBC 0.00 /100 WBC    Neutrophils (Absolute) 5.48 1.82 - 7.42 K/uL    Lymphs (Absolute) 0.70 (L) 1.00 - 4.80 K/uL    Monos (Absolute) 0.73 0.00 - 0.85 K/uL    Eos (Absolute) 0.02 0.00 - 0.51 K/uL    Baso (Absolute) 0.04 0.00 - 0.12 K/uL    Immature Granulocytes (abs) 0.04 0.00 - 0.11 K/uL    NRBC (Absolute) 0.00 K/uL   COMP METABOLIC PANEL   Result Value Ref Range    Sodium 136 135 - 145 mmol/L    Potassium 3.8 3.6 - 5.5 mmol/L    Chloride 103 96 - 112 mmol/L    Co2 25 20 - 33 mmol/L    Anion Gap 8.0 0.0 - 11.9    Glucose 93 65 - 99 mg/dL    Bun 15 8 - 22 mg/dL    Creatinine 0.71 0.50 - 1.40 mg/dL    Calcium 9.4 8.5 - 10.5 mg/dL    AST(SGOT) 119 (H) 12 - 45 U/L    ALT(SGPT) 50 2 - 50 U/L    Alkaline Phosphatase 59 30 - 99 U/L    Total Bilirubin 1.0 0.1 - 1.5 mg/dL    Albumin 3.9 3.2 - 4.9 g/dL    Total Protein 6.6 6.0 - 8.2 g/dL    Globulin 2.7 1.9 - 3.5 g/dL    A-G Ratio 1.4 g/dL   PROTHROMBIN  TIME   Result Value Ref Range    PT 15.5 (H) 12.0 - 14.6 sec    INR 1.19 (H) 0.87 - 1.13   APTT   Result Value Ref Range    APTT 27.0 24.7 - 36.0 sec   TROPONIN   Result Value Ref Range    Troponin I 0.02 0.00 - 0.04 ng/mL   URINALYSIS   Result Value Ref Range    Micro Urine Req Microscopic    LACTIC ACID   Result Value Ref Range    Lactic Acid 1.5 0.5 - 2.0 mmol/L   CREATINE KINASE   Result Value Ref Range    CPK Total 9093 (HH) 0 - 154 U/L   ESTIMATED GFR   Result Value Ref Range    GFR If African American >60 >60 mL/min/1.73 m 2    GFR If Non African American >60 >60 mL/min/1.73 m 2   EKG   Result Value Ref Range    Report       [facility] Emergency Dept.    Test Date:  2017  Pt Name:    SRIDHAR BELLA                  Department: ER  MRN:        [id number]                      Room:       [facility]  Gender:     M                            Technician: [id number]  :        1955                   Requested By:MILAGRO RIOS  Order #:    [id number]                    Reading MD:    Measurements  Intervals                                Axis  Rate:       59                           P:          58  FL:         188                          QRS:        -63  QRSD:       106                          T:          -3  QT:         464  QTc:        460    Interpretive Statements  SINUS BRADYCARDIA  LEFT ANTERIOR FASCICULAR BLOCK  BORDERLINE T ABNORMALITIES, INFERIOR LEADS  Compared to ECG 11/15/2016 18:22:18  T-wave abnormality now present  Myocardial infarct finding no longer present        All labs reviewed by me.    EK Lead EKG interpreted by me to show:  Bradycardic rhythm  Rate 50  Axis: left axis deviation  Intervals: Widened QRS  Normal T waves  Normal ST segments  My impression of this EKG: Abnormal EKG with no change from 11/15/16. No evidence of ischemia.       Radiology:   DX-KNEE 2- RIGHT   Preliminary Result      CT-HEAD W/O   Final Result      1.  There is no acute intracranial  hemorrhage or calvarial fracture.   2.  There is mild nonspecific white matter disease most likely due to small vessel ischemic change.   3.  There is minimal chronic sinus mucosal thickening.      DX-CHEST-PORTABLE (1 VIEW)    (Results Pending)   DX-CERVICAL SPINE-2 OR 3 VIEWS    (Results Pending)   DX-HIP-UNILATERAL-WITH PELVIS-1 VIEW RIGHT    (Results Pending)     The radiologist's interpretation of all radiological studies have been reviewed by me.    COURSE & MEDICAL DECISION MAKING   Nursing notes, VS, PMSFSHx reviewed in chart     Differential Diagnoses: (include but are not limited to) fracture, bruising, failure to thrive, abrasion, rhabdomyolysis.    Patient was down for 12 hours and I suspect the patient probably has some rhabdomyolysis. More importantly, I think the patient has had some sort of failure to thrive we need to look for underlying infection or metabolic abnormality that could be causing this. Unfortunately because of his history of cervical spine surgery as CT scan would most likely have significant artifact and be nondiagnostic therefore x-ray was done of the C-spine although my suspicion is low there is injury. CT head will be done to rule out bleed. Lab work be done to rule out infection.    5:10 PM - Patient was evaluated; hip, c-spine, chest, and knee xrays, CT-head, creatine kinase, CBC, PTT, APTT, troponin, UA, urine culture, lactic acid, and EKG ordered. The patient will be medicated with 1 mg dilaudid injection, 4 mg zofran injection, 1 L NS infusion for his symptoms.     Elevated CPK noted. In addition, the patient has a normal lactate CT the head shows no acute issues. At this point, we are still waiting 1 hour later for the x-rays. I have called the film room to make sure that they can find them and make sure they are on the list.    7:20 PM - Paged Hospitalist.    7:41 PM - I discussed the patient's case and the above findings with Dr. Tobin (hospitalist) who will see and admit  the patient. Care is transferred at this time.      At this point, if the x-rays show anything abnormal that requires surgical intervention addendum will be made. I already reviewed the knee x-ray and the hip x-ray and do not find anything acutely myself.    DISPOSITION:  Patient will be admitted to Dr. Tobin in guarded condition.       FINAL IMPRESSION   1. Non-traumatic rhabdomyolysis         Karolyn BURNHAM (Scribe), am scribing for, and in the presence of, Izaiah Carrillo, *.    Electronically signed by: Karolyn Cooper (Scribe), 1/1/2017    Izaiah BURNHAM, * personally performed the services described in this documentation, as scribed by Karolyn Cooper in my presence, and it is both accurate and complete.    The note accurately reflects work and decisions made by me.  Izaiah Carrillo  1/1/2017  7:47 PM

## 2017-01-02 NOTE — ED NOTES
"Med rec updated and complete  Allergies reviewed.  Dicussed current medications and   Last doses taken.  Pt states that he takes 3 \"norco\" when he is in pain.  Pt also stated that he knows his body and he will typically   Take 3 at a time.  Pt states that he also does not take  Any other narcotics for pain and keeps them locked up.  "

## 2017-01-03 LAB
ALBUMIN SERPL BCP-MCNC: 3.3 G/DL (ref 3.2–4.9)
ALBUMIN/GLOB SERPL: 1.3 G/DL
ALP SERPL-CCNC: 50 U/L (ref 30–99)
ALT SERPL-CCNC: 50 U/L (ref 2–50)
ANION GAP SERPL CALC-SCNC: 6 MMOL/L (ref 0–11.9)
AST SERPL-CCNC: 91 U/L (ref 12–45)
BACTERIA UR CULT: NORMAL
BASOPHILS # BLD AUTO: 0.4 % (ref 0–1.8)
BASOPHILS # BLD: 0.03 K/UL (ref 0–0.12)
BILIRUB SERPL-MCNC: 0.6 MG/DL (ref 0.1–1.5)
BUN SERPL-MCNC: 9 MG/DL (ref 8–22)
CALCIUM SERPL-MCNC: 8.9 MG/DL (ref 8.5–10.5)
CHLORIDE SERPL-SCNC: 106 MMOL/L (ref 96–112)
CK SERPL-CCNC: 4530 U/L (ref 0–154)
CO2 SERPL-SCNC: 26 MMOL/L (ref 20–33)
CREAT SERPL-MCNC: 0.59 MG/DL (ref 0.5–1.4)
EOSINOPHIL # BLD AUTO: 0.1 K/UL (ref 0–0.51)
EOSINOPHIL NFR BLD: 1.4 % (ref 0–6.9)
ERYTHROCYTE [DISTWIDTH] IN BLOOD BY AUTOMATED COUNT: 49.7 FL (ref 35.9–50)
GFR SERPL CREATININE-BSD FRML MDRD: >60 ML/MIN/1.73 M 2
GLOBULIN SER CALC-MCNC: 2.5 G/DL (ref 1.9–3.5)
GLUCOSE SERPL-MCNC: 94 MG/DL (ref 65–99)
HCT VFR BLD AUTO: 35.6 % (ref 42–52)
HGB BLD-MCNC: 11.7 G/DL (ref 14–18)
IMM GRANULOCYTES # BLD AUTO: 0.04 K/UL (ref 0–0.11)
IMM GRANULOCYTES NFR BLD AUTO: 0.6 % (ref 0–0.9)
LYMPHOCYTES # BLD AUTO: 0.96 K/UL (ref 1–4.8)
LYMPHOCYTES NFR BLD: 13.7 % (ref 22–41)
MCH RBC QN AUTO: 31 PG (ref 27–33)
MCHC RBC AUTO-ENTMCNC: 32.9 G/DL (ref 33.7–35.3)
MCV RBC AUTO: 94.2 FL (ref 81.4–97.8)
MONOCYTES # BLD AUTO: 0.75 K/UL (ref 0–0.85)
MONOCYTES NFR BLD AUTO: 10.7 % (ref 0–13.4)
NEUTROPHILS # BLD AUTO: 5.11 K/UL (ref 1.82–7.42)
NEUTROPHILS NFR BLD: 73.2 % (ref 44–72)
NRBC # BLD AUTO: 0 K/UL
NRBC BLD AUTO-RTO: 0 /100 WBC
PLATELET # BLD AUTO: 180 K/UL (ref 164–446)
PMV BLD AUTO: 9.1 FL (ref 9–12.9)
POTASSIUM SERPL-SCNC: 3.8 MMOL/L (ref 3.6–5.5)
PROT SERPL-MCNC: 5.8 G/DL (ref 6–8.2)
RBC # BLD AUTO: 3.78 M/UL (ref 4.7–6.1)
SIGNIFICANT IND 70042: NORMAL
SITE SITE: NORMAL
SODIUM SERPL-SCNC: 138 MMOL/L (ref 135–145)
SOURCE SOURCE: NORMAL
WBC # BLD AUTO: 7 K/UL (ref 4.8–10.8)

## 2017-01-03 PROCEDURE — 36415 COLL VENOUS BLD VENIPUNCTURE: CPT

## 2017-01-03 PROCEDURE — 97166 OT EVAL MOD COMPLEX 45 MIN: CPT

## 2017-01-03 PROCEDURE — 80053 COMPREHEN METABOLIC PANEL: CPT

## 2017-01-03 PROCEDURE — 97162 PT EVAL MOD COMPLEX 30 MIN: CPT

## 2017-01-03 PROCEDURE — G8978 MOBILITY CURRENT STATUS: HCPCS | Mod: CK

## 2017-01-03 PROCEDURE — 700102 HCHG RX REV CODE 250 W/ 637 OVERRIDE(OP): Performed by: FAMILY MEDICINE

## 2017-01-03 PROCEDURE — 82550 ASSAY OF CK (CPK): CPT

## 2017-01-03 PROCEDURE — G8987 SELF CARE CURRENT STATUS: HCPCS | Mod: CK

## 2017-01-03 PROCEDURE — 700105 HCHG RX REV CODE 258: Performed by: HOSPITALIST

## 2017-01-03 PROCEDURE — A9270 NON-COVERED ITEM OR SERVICE: HCPCS | Performed by: FAMILY MEDICINE

## 2017-01-03 PROCEDURE — A9270 NON-COVERED ITEM OR SERVICE: HCPCS | Performed by: HOSPITALIST

## 2017-01-03 PROCEDURE — 97530 THERAPEUTIC ACTIVITIES: CPT

## 2017-01-03 PROCEDURE — 85025 COMPLETE CBC W/AUTO DIFF WBC: CPT

## 2017-01-03 PROCEDURE — 700102 HCHG RX REV CODE 250 W/ 637 OVERRIDE(OP): Performed by: HOSPITALIST

## 2017-01-03 PROCEDURE — 700111 HCHG RX REV CODE 636 W/ 250 OVERRIDE (IP): Performed by: INTERNAL MEDICINE

## 2017-01-03 PROCEDURE — 700111 HCHG RX REV CODE 636 W/ 250 OVERRIDE (IP): Performed by: NURSE PRACTITIONER

## 2017-01-03 PROCEDURE — G8988 SELF CARE GOAL STATUS: HCPCS | Mod: CJ

## 2017-01-03 PROCEDURE — 770006 HCHG ROOM/CARE - MED/SURG/GYN SEMI*

## 2017-01-03 PROCEDURE — 700111 HCHG RX REV CODE 636 W/ 250 OVERRIDE (IP): Performed by: FAMILY MEDICINE

## 2017-01-03 PROCEDURE — A9270 NON-COVERED ITEM OR SERVICE: HCPCS | Performed by: INTERNAL MEDICINE

## 2017-01-03 PROCEDURE — G8979 MOBILITY GOAL STATUS: HCPCS | Mod: CI

## 2017-01-03 PROCEDURE — 700102 HCHG RX REV CODE 250 W/ 637 OVERRIDE(OP): Performed by: INTERNAL MEDICINE

## 2017-01-03 RX ORDER — LACTULOSE 20 G/30ML
30 SOLUTION ORAL
Status: DISCONTINUED | OUTPATIENT
Start: 2017-01-03 | End: 2017-01-05 | Stop reason: HOSPADM

## 2017-01-03 RX ORDER — AMOXICILLIN 250 MG
1 CAPSULE ORAL
Status: DISCONTINUED | OUTPATIENT
Start: 2017-01-03 | End: 2017-01-05 | Stop reason: HOSPADM

## 2017-01-03 RX ORDER — ZOLPIDEM TARTRATE 5 MG/1
5 TABLET ORAL NIGHTLY PRN
Status: DISCONTINUED | OUTPATIENT
Start: 2017-01-03 | End: 2017-01-05 | Stop reason: HOSPADM

## 2017-01-03 RX ORDER — MORPHINE SULFATE 4 MG/ML
1-2 INJECTION, SOLUTION INTRAMUSCULAR; INTRAVENOUS EVERY 4 HOURS PRN
Status: DISCONTINUED | OUTPATIENT
Start: 2017-01-03 | End: 2017-01-05 | Stop reason: HOSPADM

## 2017-01-03 RX ORDER — BISACODYL 10 MG
10 SUPPOSITORY, RECTAL RECTAL
Status: DISCONTINUED | OUTPATIENT
Start: 2017-01-03 | End: 2017-01-05 | Stop reason: HOSPADM

## 2017-01-03 RX ORDER — DOCUSATE SODIUM 100 MG/1
100 CAPSULE, LIQUID FILLED ORAL 2 TIMES DAILY PRN
Status: DISCONTINUED | OUTPATIENT
Start: 2017-01-03 | End: 2017-01-05 | Stop reason: HOSPADM

## 2017-01-03 RX ORDER — TERAZOSIN 5 MG/1
10 CAPSULE ORAL EVERY EVENING
Status: DISCONTINUED | OUTPATIENT
Start: 2017-01-03 | End: 2017-01-05 | Stop reason: HOSPADM

## 2017-01-03 RX ORDER — HYDROCODONE BITARTRATE AND ACETAMINOPHEN 10; 325 MG/1; MG/1
1-2 TABLET ORAL EVERY 6 HOURS PRN
Status: DISCONTINUED | OUTPATIENT
Start: 2017-01-03 | End: 2017-01-05 | Stop reason: HOSPADM

## 2017-01-03 RX ORDER — ENEMA 19; 7 G/133ML; G/133ML
1 ENEMA RECTAL
Status: DISCONTINUED | OUTPATIENT
Start: 2017-01-03 | End: 2017-01-05 | Stop reason: HOSPADM

## 2017-01-03 RX ORDER — MORPHINE SULFATE 4 MG/ML
2 INJECTION, SOLUTION INTRAMUSCULAR; INTRAVENOUS ONCE
Status: COMPLETED | OUTPATIENT
Start: 2017-01-03 | End: 2017-01-03

## 2017-01-03 RX ADMIN — HYDROCODONE BITARTRATE AND ACETAMINOPHEN 2 TABLET: 10; 325 TABLET ORAL at 19:26

## 2017-01-03 RX ADMIN — BACLOFEN 20 MG: 10 TABLET ORAL at 23:47

## 2017-01-03 RX ADMIN — ENOXAPARIN SODIUM 40 MG: 100 INJECTION SUBCUTANEOUS at 07:15

## 2017-01-03 RX ADMIN — GABAPENTIN 1200 MG: 400 CAPSULE ORAL at 05:34

## 2017-01-03 RX ADMIN — MORPHINE SULFATE 2 MG: 4 INJECTION INTRAVENOUS at 02:52

## 2017-01-03 RX ADMIN — GABAPENTIN 1200 MG: 400 CAPSULE ORAL at 11:23

## 2017-01-03 RX ADMIN — MORPHINE SULFATE 0.5 MG: 4 INJECTION INTRAVENOUS at 00:36

## 2017-01-03 RX ADMIN — LEVOTHYROXINE SODIUM 150 MCG: 150 TABLET ORAL at 07:00

## 2017-01-03 RX ADMIN — BACLOFEN 20 MG: 10 TABLET ORAL at 00:37

## 2017-01-03 RX ADMIN — MORPHINE SULFATE 2 MG: 4 INJECTION INTRAVENOUS at 15:33

## 2017-01-03 RX ADMIN — MORPHINE SULFATE 2 MG: 4 INJECTION INTRAVENOUS at 23:46

## 2017-01-03 RX ADMIN — MORPHINE SULFATE 2 MG: 4 INJECTION INTRAVENOUS at 19:43

## 2017-01-03 RX ADMIN — BETHANECHOL CHLORIDE 25 MG: 25 TABLET ORAL at 17:34

## 2017-01-03 RX ADMIN — BACLOFEN 20 MG: 10 TABLET ORAL at 11:23

## 2017-01-03 RX ADMIN — HYDROCODONE BITARTRATE AND ACETAMINOPHEN 2 TABLET: 10; 325 TABLET ORAL at 02:06

## 2017-01-03 RX ADMIN — BETHANECHOL CHLORIDE 25 MG: 25 TABLET ORAL at 00:36

## 2017-01-03 RX ADMIN — BETHANECHOL CHLORIDE 25 MG: 25 TABLET ORAL at 23:47

## 2017-01-03 RX ADMIN — SODIUM CHLORIDE: 9 INJECTION, SOLUTION INTRAVENOUS at 11:22

## 2017-01-03 RX ADMIN — BACLOFEN 20 MG: 10 TABLET ORAL at 05:35

## 2017-01-03 RX ADMIN — HYDROCODONE BITARTRATE AND ACETAMINOPHEN 2 TABLET: 10; 325 TABLET ORAL at 09:34

## 2017-01-03 RX ADMIN — BACLOFEN 20 MG: 10 TABLET ORAL at 17:34

## 2017-01-03 RX ADMIN — GABAPENTIN 1200 MG: 400 CAPSULE ORAL at 23:47

## 2017-01-03 RX ADMIN — BETHANECHOL CHLORIDE 25 MG: 25 TABLET ORAL at 11:24

## 2017-01-03 RX ADMIN — MORPHINE SULFATE 2 MG: 4 INJECTION INTRAVENOUS at 07:15

## 2017-01-03 RX ADMIN — BETHANECHOL CHLORIDE 25 MG: 25 TABLET ORAL at 05:33

## 2017-01-03 RX ADMIN — OXYCODONE HYDROCHLORIDE AND ACETAMINOPHEN 500 MG: 500 TABLET ORAL at 07:14

## 2017-01-03 RX ADMIN — GABAPENTIN 1200 MG: 400 CAPSULE ORAL at 17:35

## 2017-01-03 RX ADMIN — GABAPENTIN 1200 MG: 400 CAPSULE ORAL at 00:37

## 2017-01-03 RX ADMIN — MORPHINE SULFATE 2 MG: 4 INJECTION INTRAVENOUS at 11:25

## 2017-01-03 ASSESSMENT — ENCOUNTER SYMPTOMS
FEVER: 0
CHILLS: 0
MYALGIAS: 1
FOCAL WEAKNESS: 1

## 2017-01-03 ASSESSMENT — GAIT ASSESSMENTS: GAIT LEVEL OF ASSIST: UNABLE TO PARTICIPATE

## 2017-01-03 ASSESSMENT — PAIN SCALES - GENERAL
PAINLEVEL_OUTOF10: 10
PAINLEVEL_OUTOF10: 9
PAINLEVEL_OUTOF10: 9
PAINLEVEL_OUTOF10: 10
PAINLEVEL_OUTOF10: ASSUMED PAIN PRESENT

## 2017-01-03 ASSESSMENT — ACTIVITIES OF DAILY LIVING (ADL): TOILETING: INDEPENDENT

## 2017-01-03 NOTE — WOUND TEAM
"[facility]  Inpatient Services  Initial Wound & Skin Care Evaluation    Admission Date:  1/1/2017   HPI, PMH, SH: Reviewed  Unit where seen by Wound Team: S199/[id number]    WOUND CONSULT RELATED TO: multiple pressure injuries    SUBJECTIVE:   \"I was stuck holding myself up so I didn't drown in my vomit.\"    Self Report / Pain Level: no c/o pain      OBJECTIVE: on pressure redistribution mattress, pt wearing shoes and pj pants in bed  WOUND TYPE, LOCATION, CHARACTERISTICS (Pressure ulcers: location, stage, POA or date identified)  Wound Abrasion Knee Left Middle  Periwound Skin:  (scarred)  Drainage : None  Tissue Type and %:    Red/pink 100%  Wound Edges:    open    Odor:     None   Exposed structure(s):   No   Signs and Symptoms of Infection: none    Pressure Ulcer  Unstageable POA Hip Left Anterior  Periwound Skin: Discolored  Drainage : None  Tissue Type and %:    Red/pink and 50% yellow  Wound Edges:    Attached     Odor:     None   Exposed structure(s):   No   Signs and Symptoms of Infection: none    Pressure Ulcer  Unstageable POA Hip Right Anterior  Periwound Skin: Discolored  Drainage : None  Tissue Type and %:    Red/pink 70% 30% yellow  Wound Edges:    Attached or just starting to open    Odor:     None   Exposed structure(s):   No   Signs and Symptoms of Infection: none    Pressure Ulcer  Stage 2 POA Thigh Right Anterior  Periwound Skin: Intact  Drainage : Scant, Serous     Tissue Type and %:    Red/pink 100%  Wound Edges:    open    Odor:     None   Exposed structure(s):   No   Signs and Symptoms of Infection: none    Wound Abrasion Left Wrist  Periwound Skin: intact  Drainage : None  Tissue Type and %:    Red/pink 100%  Wound Edges:    open    Odor:     None   Exposed structure(s):   No   Signs and Symptoms of Infection: none    Measurements: taken 1/2/17   L knee  Hip Left Anterior Hip Right Anterior Thigh Right Anterior L wrist  Length (cm):  1  6    14   3  2.5  Width (cm): "  1.7  13   6   4.5  2  Depth (cm):  0.1  (nm)   (nm)   0.1  0.1   Tracts/undermining:  None     INTERVENTIONS BY WOUND TEAM: viewed pt's wounds, cleaned with NS, dried. Applied drsgs. Pt repositioned L side lying.   Knee Left Middle-Dressing Options: Open to Air  Hip Left and Rightr-Dressing Options: Mepilex  Thigh Right Anterior-Dressing Options: Adhesive Foam    Interdisciplinary consultation:   With Nursing;With Patient     EVALUATION:    Factors affecting wound healing: incomplete quadriplegia, contractures B hands  Goals: Body to autolytic debride non-viable tissue both hips, Mepilex to protect and provide some moist wound healing.      NURSING PLAN OF CARE ORDERS (X):    Dressing changes: See Dressing Maintenance orders: x  Skin care: See Skin Care orders: x  Rectal tube care: See Rectal Tube Care orders:   Other orders:    RSKIN: CURRENT (X) ORDERED (O)  Q shift Dale:  X  Q shift pressure point assessments:  X  Pressure redistribution mattress    x    VENKATESH      Bariatric VENKATESH      Bariatric foam        Heel float boots       Heels floated on pillows   o   Barrier wipes      Barrier Cream      Barrier paste      Sacral silicone dressing      Padded O2 tubing      Anchorfast      Trach with Optifoam split foam       Waffle cushion      Rectal tube or BMS      Antifungal tx    Turn q 2 hours x  Up to chair  Ambulate   PT/OT     Dietician      PO   x  TF   TPN     PVN    NPO   # days   Other       WOUND TEAM PLAN OF CARE (X):   NPWT change 3 x week:        Dressing changes by wound team:       Follow up as needed:  x     Other (explain):    Anticipated discharge plans (X):  SNF:           Home Care:           Outpatient Wound Center:            Self Care:            Other:    tbd

## 2017-01-03 NOTE — PROGRESS NOTES
Received report form off going nurse.  No S/S of distress/discomfort noted.  Call light within reach.  Bed in low position and locked.  Will continue to monitor.

## 2017-01-03 NOTE — CARE PLAN
Problem: Safety  Goal: Will remain free from injury  Outcome: PROGRESSING AS EXPECTED  Hourly rounds done. Call light within reach. Needs attended on a timely manner. Bed in low position.  Refused bed alarm. Educated on the risk of falling. Pt calls appropriately.  Will continue to monitor.

## 2017-01-03 NOTE — DIETARY
NUTRITION SERVICES - WOUND FOLLOW UP  Wound team was consulted on 1/2 to evaluate the pt's wounds and provide further recommendations. Per review of chart and wound team note, wounds have been classified as follows:     1. Pressure ulcer (unstageable) POA Hip L Anterior  2. Pressure ulcer (unstageable) POA Hip R Anterior  3. Pressure ulcer (stage 2) POA Thigh R Anterior  4. Wound abrasion Knee L Middle    Plan / Recommend - PO intake has been % of most meals per ADL flow sheet. Recommend additional vitamin therapy to aid in wound healing and skin integrity:    · Multivitamin with minerals daily  · 220mg Zinc Sulfate BID for 14 days   · 500 mg Vitamin C BID for 14 days  · 10,000 International Units Vitamin A for 10 days    Discussed with RN.     RD monitoring.
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
## 2017-01-03 NOTE — THERAPY
"Physical Therapy Evaluation completed.   Bed Mobility:  Supine to Sit: Moderate Assist  Transfers: Sit to Stand: Maximal Assist  Gait: Level Of Assist: Unable to Participate with No Equipment Needed       Plan of Care: Will benefit from Physical Therapy 3 times per week  Discharge Recommendations: Equipment: Will Continue to Assess for Equipment Needs. Post-acute therapy recommended before discharged home pending mobility improvement.     Pt presents with decreased functional mobility most likely due to B LE weakness and generalized pain. Pt reports he was previously able to perform standing and transfer, but today required max A to stand. Scooter was not present during eval, so transfer was deferred. Pt does not present as someone with complete tetraplegia but does appear to have more central cord issues. Regardless, he does appear to be below baseline.     See \"Rehab Therapy-Acute\" Patient Summary Report for complete documentation.     "

## 2017-01-03 NOTE — PROGRESS NOTES
Hospitalist called r/t outstanding fluid bolus on MAR.  She  Stated to just continue with NS at 150 for now.  Will continue to monitor.

## 2017-01-03 NOTE — PROGRESS NOTES
Patients call light exchanged for soft touch call light as pt could not demonstrate use of original call light. Refusing bed alarm despite education. Pt verbalizes understanding of calling for assistance and not attempting to get oob without assist.

## 2017-01-03 NOTE — PROGRESS NOTES
"Patient complaining of increasing pain since last night, concerned that there is something wrong, pt states \"he would rather be dead\". Medicated per mar with morphine and norco with no relief. Pt up to commode with +BM. Nancy, hospitalist RN notified of patient's uncontrolled pain and elevated blood pressure, she will notify Dr. Cuenca at this time. No new orders received   "

## 2017-01-03 NOTE — THERAPY
"Occupational Therapy Evaluation completed.   Functional Status:  Pt performed supine to sit with max a, tolerated sitting eob with F- static balance and Poor + dynamic balance, STS from eob with max a and eob->chair max a x2. Pt has caregiver at home in the [facility] 7 days a week with ADLs and performs IADLs for pt. During daytime, pt able to t/f self to BSC<>scooter, manage clothing with increased time and use of AE. Pt appears to be below his baseline at this time and would benefit from acute skilled services and likely will need post acute skilled services. Also, might need to look into increased caregiving to assess for safety during the daytime upon return home.   Plan of Care: Will benefit from Occupational Therapy 3 times per week  Discharge Recommendations:  Equipment: Will Continue to Assess for Equipment Needs. Post-acute therapy recommended before discharged home.    See \"Rehab Therapy-Acute\" Patient Summary Report for complete documentation.    "

## 2017-01-03 NOTE — PROGRESS NOTES
Hospital Medicine Progress Note, Adult, Complex               Author: Vicki Rodriguez Date & Time created: 1/2/2017  4:48 PM     Interval History:  61M  With quadriplegia, wheelchair bound s/p fall with generalized body ache    1/2  Pt agitated this am, wanted to leave AMA, d/t pain, his rx were ordered incorrectly, pt was noted to be hypotensive and on aggressive ivf hydration for rhabdo, extensive d/w pt regarding weakness, renal failure and death, rx time adjusted by pharmacy, pt now more calm and agreeable, son present, to bring in his wheelchair for cont pt/ot      Review of Systems:  Review of Systems   Constitutional: Negative for fever, chills, malaise/fatigue and diaphoresis.   HENT: Negative for congestion and hearing loss.    Eyes: Negative for blurred vision and double vision.   Respiratory: Negative for cough and shortness of breath.    Cardiovascular: Negative for chest pain, palpitations and leg swelling.   Gastrointestinal: Negative for nausea, vomiting, abdominal pain, diarrhea and constipation.   Genitourinary: Negative for dysuria and flank pain.   Musculoskeletal: Positive for myalgias. Negative for back pain and joint pain.   Neurological: Positive for focal weakness and weakness. Negative for dizziness, tingling, tremors, sensory change, speech change, seizures and headaches.   Psychiatric/Behavioral: Positive for depression. Negative for memory loss. The patient is nervous/anxious.        Physical Exam:  Physical Exam   Constitutional: He is oriented to person, place, and time. He appears well-nourished. No distress.   contracted   HENT:   Head: Normocephalic and atraumatic.   Nose: Nose normal.   Eyes: EOM are normal. Pupils are equal, round, and reactive to light. Right eye exhibits no discharge. Left eye exhibits no discharge. No scleral icterus.   Neck: Neck supple. No JVD present. No thyromegaly present.   Cardiovascular: Normal rate and intact distal pulses.    No murmur  heard.  Pulmonary/Chest: Effort normal and breath sounds normal. No respiratory distress. He has no wheezes.   Abdominal: Soft. Bowel sounds are normal. He exhibits no distension and no mass. There is no tenderness.   Musculoskeletal: He exhibits edema and tenderness.   Neurological: He is alert and oriented to person, place, and time. No cranial nerve deficit. He exhibits abnormal muscle tone. Coordination abnormal.   Skin: Skin is warm and dry. No rash noted. He is not diaphoretic. There is erythema.   Diffuse skin tear, anterior thigh, abdomen, back  Stage 1 debub   Psychiatric: Thought content normal.   Nursing note and vitals reviewed.      Labs:        Invalid input(s): [id number]  Recent Labs      17   034   CPKTOTAL  9093*  7609*   TROPONINI  0.02   --      Recent Labs      17   034   SODIUM  136  135   POTASSIUM  3.8  4.0   CHLORIDE  103  108   CO2  25  23   BUN  15  15   CREATININE  0.71  0.73   CALCIUM  9.4  8.4*     Recent Labs      17   0341   ALTSGPT  50  48   ASTSGOT  119*  111*   ALKPHOSPHAT  59  51   TBILIRUBIN  1.0  0.6   GLUCOSE  93  94     Recent Labs      17   0341   RBC  4.11*  3.66*   HEMOGLOBIN  12.9*  11.2*   HEMATOCRIT  38.0*  34.9*   PLATELETCT  202  169   PROTHROMBTM  15.5*   --    APTT  27.0   --    INR  1.19*   --      Recent Labs      17   0341   WBC  7.0  5.4   NEUTSPOLYS  78.10*  67.70   LYMPHOCYTES  10.00*  17.40*   MONOCYTES  10.40  12.80   EOSINOPHILS  0.30  1.30   BASOPHILS  0.60  0.40   ASTSGOT  119*  111*   ALTSGPT  50  48   ALKPHOSPHAT  59  51   TBILIRUBIN  1.0  0.6           Hemodynamics:  Temp (24hrs), Av.9 °C (98.5 °F), Min:36.5 °C (97.7 °F), Max:37.2 °C (98.9 °F)  Temperature: 36.5 °C (97.7 °F)  Pulse  Av  Min: 58  Max: 105Heart Rate (Monitored): 78  Blood Pressure: (!) 82/51 mmHg, NIBP: (!) 96/60 mmHg     Respiratory:    Respiration: 18, Pulse  Oximetry: 91 %           Fluids:    Intake/Output Summary (Last 24 hours) at 01/02/17 1648  Last data filed at 01/02/17 0600   Gross per 24 hour   Intake      0 ml   Output   1100 ml   Net  -1100 ml     Weight: 83.9 kg (184 lb 15.5 oz)  GI/Nutrition:  Orders Placed This Encounter   Procedures   • Diet Order     Standing Status: Standing      Number of Occurrences: 1      Standing Expiration Date:      Order Specific Question:  Diet:     Answer:  Regular [1]     Medical Decision Making, by Problem:  Active Hospital Problems    Diagnosis   • *Rhabdomyolysis [M62.82] improving  2nd to fall and dehydration  - cont ivf, f/u renal function  - avoid nsaids   • Hypotension [I95.9]2nd to dehydration, does not appear septic  Cont ivf  Bolus now, monitor closely  LA wnl   • Decubital ulcer [L89.90]monitor, wound care following     • Frequent falls [R29.6]pt/ot   • Opioid type dependence, continuous (HCC) [F11.20]cont home rx  Morphine iv, advise limited use   • Quadriplegia, post-traumatic (HCC) [G82.50]lives alone, self transfers to wheelchair, family to bring in chair   • Chronic pain [G89.29]limit narcotics, hold for sedation   • Hypothyroidism due to acquired atrophy of thyroid [E03.4]monitor, cont synthroid   Agitation- 2nd to pain, now controlled, rx time adjusted    F/u am labs  Encourage oral intake    Dispo pending    The total time spent face to face with this patient was 75 mins of which 60% of time was spent on counseling and coordinating care.  Specifically speaking w/ rn, team and pt and family at length re POC    Labs reviewed, Medications reviewed and Radiology images reviewed        DVT Prophylaxis: Enoxaparin (Lovenox)    Ulcer prophylaxis: Not indicated    Assessed for rehab: Patient was assess for and/or received rehabilitation services during this hospitalization

## 2017-01-03 NOTE — CARE PLAN
Problem: Safety  Goal: Will remain free from injury  Outcome: PROGRESSING AS EXPECTED  Soft touch call light given to patient, uses correctly, bed locked in low position, hourly rounding, educated to call for assistance    Problem: Pain Management  Goal: Pain level will decrease to patient’s comfort goal  Outcome: PROGRESSING SLOWER THAN EXPECTED  Medicated per mar prn, updated hospitalist LYDIA Cruz that pain is not being controlled, Dr. Cuenca notified    Problem: Skin Integrity  Goal: Risk for impaired skin integrity will decrease  Dressings in place to wounds, wound care on board

## 2017-01-03 NOTE — PROGRESS NOTES
Lab called with critical result of CPK at 0409. Critical lab result read back to lab.   Dr. Tobin notified of critical lab result at 0420.  Critical lab result read back by Dr. Tobin.

## 2017-01-04 ENCOUNTER — HOSPITAL ENCOUNTER (INPATIENT)
Facility: REHABILITATION | Age: 62
End: 2017-01-04
Attending: PHYSICAL MEDICINE & REHABILITATION | Admitting: PHYSICAL MEDICINE & REHABILITATION
Payer: MEDICARE

## 2017-01-04 LAB — CK SERPL-CCNC: 2324 U/L (ref 0–154)

## 2017-01-04 PROCEDURE — 770006 HCHG ROOM/CARE - MED/SURG/GYN SEMI*

## 2017-01-04 PROCEDURE — 700102 HCHG RX REV CODE 250 W/ 637 OVERRIDE(OP): Performed by: NURSE PRACTITIONER

## 2017-01-04 PROCEDURE — 700102 HCHG RX REV CODE 250 W/ 637 OVERRIDE(OP): Performed by: HOSPITALIST

## 2017-01-04 PROCEDURE — 82550 ASSAY OF CK (CPK): CPT

## 2017-01-04 PROCEDURE — 97110 THERAPEUTIC EXERCISES: CPT

## 2017-01-04 PROCEDURE — A9270 NON-COVERED ITEM OR SERVICE: HCPCS | Performed by: NURSE PRACTITIONER

## 2017-01-04 PROCEDURE — A9270 NON-COVERED ITEM OR SERVICE: HCPCS | Performed by: INTERNAL MEDICINE

## 2017-01-04 PROCEDURE — 99232 SBSQ HOSP IP/OBS MODERATE 35: CPT | Performed by: HOSPITALIST

## 2017-01-04 PROCEDURE — 97530 THERAPEUTIC ACTIVITIES: CPT

## 2017-01-04 PROCEDURE — 700111 HCHG RX REV CODE 636 W/ 250 OVERRIDE (IP): Performed by: FAMILY MEDICINE

## 2017-01-04 PROCEDURE — 36415 COLL VENOUS BLD VENIPUNCTURE: CPT

## 2017-01-04 PROCEDURE — 700102 HCHG RX REV CODE 250 W/ 637 OVERRIDE(OP): Performed by: INTERNAL MEDICINE

## 2017-01-04 PROCEDURE — 700102 HCHG RX REV CODE 250 W/ 637 OVERRIDE(OP): Performed by: FAMILY MEDICINE

## 2017-01-04 PROCEDURE — A9270 NON-COVERED ITEM OR SERVICE: HCPCS | Performed by: FAMILY MEDICINE

## 2017-01-04 PROCEDURE — 700105 HCHG RX REV CODE 258: Performed by: HOSPITALIST

## 2017-01-04 PROCEDURE — A9270 NON-COVERED ITEM OR SERVICE: HCPCS | Performed by: HOSPITALIST

## 2017-01-04 RX ORDER — LEVOTHYROXINE SODIUM 0.1 MG/1
100 TABLET ORAL
Qty: 30 TAB | Status: SHIPPED | DISCHARGE
Start: 2017-01-05 | End: 2017-02-17 | Stop reason: SDUPTHER

## 2017-01-04 RX ORDER — BACITRACIN ZINC AND POLYMYXIN B SULFATE 500; 1000 [USP'U]/G; [USP'U]/G
OINTMENT TOPICAL 2 TIMES DAILY
Status: DISCONTINUED | OUTPATIENT
Start: 2017-01-04 | End: 2017-01-05 | Stop reason: HOSPADM

## 2017-01-04 RX ORDER — LEVOTHYROXINE SODIUM 0.05 MG/1
100 TABLET ORAL
Status: DISCONTINUED | OUTPATIENT
Start: 2017-01-05 | End: 2017-01-05 | Stop reason: HOSPADM

## 2017-01-04 RX ORDER — LEVOTHYROXINE SODIUM 0.15 MG/1
150 TABLET ORAL ONCE
Status: DISCONTINUED | OUTPATIENT
Start: 2017-01-04 | End: 2017-01-04

## 2017-01-04 RX ORDER — HYDROCODONE BITARTRATE AND ACETAMINOPHEN 10; 325 MG/1; MG/1
1-2 TABLET ORAL EVERY 6 HOURS PRN
Qty: 20 TAB | Refills: 0 | Status: SHIPPED | DISCHARGE
Start: 2017-01-04 | End: 2018-01-07

## 2017-01-04 RX ADMIN — HYDROCODONE BITARTRATE AND ACETAMINOPHEN 2 TABLET: 10; 325 TABLET ORAL at 09:08

## 2017-01-04 RX ADMIN — BACLOFEN 20 MG: 10 TABLET ORAL at 17:07

## 2017-01-04 RX ADMIN — MORPHINE SULFATE 2 MG: 4 INJECTION INTRAVENOUS at 22:03

## 2017-01-04 RX ADMIN — MORPHINE SULFATE 2 MG: 4 INJECTION INTRAVENOUS at 06:11

## 2017-01-04 RX ADMIN — BETHANECHOL CHLORIDE 25 MG: 25 TABLET ORAL at 12:14

## 2017-01-04 RX ADMIN — TERAZOSIN HYDROCHLORIDE ANHYDROUS 10 MG: 5 CAPSULE ORAL at 22:03

## 2017-01-04 RX ADMIN — BACITRACIN ZINC AND POLYMYXIN B SULFATE: 500; 10000 OINTMENT TOPICAL at 14:23

## 2017-01-04 RX ADMIN — BETHANECHOL CHLORIDE 25 MG: 25 TABLET ORAL at 06:12

## 2017-01-04 RX ADMIN — GABAPENTIN 1200 MG: 400 CAPSULE ORAL at 06:11

## 2017-01-04 RX ADMIN — OXYCODONE HYDROCHLORIDE AND ACETAMINOPHEN 500 MG: 500 TABLET ORAL at 09:02

## 2017-01-04 RX ADMIN — ZOLPIDEM TARTRATE 5 MG: 5 TABLET, FILM COATED ORAL at 22:49

## 2017-01-04 RX ADMIN — BACLOFEN 20 MG: 10 TABLET ORAL at 06:11

## 2017-01-04 RX ADMIN — MORPHINE SULFATE 2 MG: 4 INJECTION INTRAVENOUS at 00:23

## 2017-01-04 RX ADMIN — SODIUM CHLORIDE: 9 INJECTION, SOLUTION INTRAVENOUS at 00:13

## 2017-01-04 RX ADMIN — BACLOFEN 20 MG: 10 TABLET ORAL at 12:14

## 2017-01-04 RX ADMIN — GABAPENTIN 1200 MG: 400 CAPSULE ORAL at 17:07

## 2017-01-04 RX ADMIN — HYDROCODONE BITARTRATE AND ACETAMINOPHEN 2 TABLET: 10; 325 TABLET ORAL at 22:49

## 2017-01-04 RX ADMIN — HYDROCODONE BITARTRATE AND ACETAMINOPHEN 2 TABLET: 10; 325 TABLET ORAL at 16:06

## 2017-01-04 RX ADMIN — SODIUM CHLORIDE: 9 INJECTION, SOLUTION INTRAVENOUS at 12:09

## 2017-01-04 RX ADMIN — BETHANECHOL CHLORIDE 25 MG: 25 TABLET ORAL at 17:07

## 2017-01-04 RX ADMIN — GABAPENTIN 1200 MG: 400 CAPSULE ORAL at 12:14

## 2017-01-04 RX ADMIN — ENOXAPARIN SODIUM 40 MG: 100 INJECTION SUBCUTANEOUS at 09:03

## 2017-01-04 RX ADMIN — MORPHINE SULFATE 2 MG: 4 INJECTION INTRAVENOUS at 16:07

## 2017-01-04 ASSESSMENT — PAIN SCALES - GENERAL
PAINLEVEL_OUTOF10: ASSUMED PAIN PRESENT
PAINLEVEL_OUTOF10: 6
PAINLEVEL_OUTOF10: 10
PAINLEVEL_OUTOF10: 9
PAINLEVEL_OUTOF10: 8
PAINLEVEL_OUTOF10: 6
PAINLEVEL_OUTOF10: 6

## 2017-01-04 ASSESSMENT — GAIT ASSESSMENTS: GAIT LEVEL OF ASSIST: UNABLE TO PARTICIPATE

## 2017-01-04 NOTE — THERAPY
"Occupational Therapy Treatment completed with focus on ADLs and patient education.  Functional Status:  Pt seen for OT tx, with emphasis on PROM and AAROM of BUE's, STS from scooter with cga/min a with forward flexion to support through the scooter handles to wt-shift and reposition self in scooter, assessed for AE needs during self feeding tasks. Pt reports at home he's able to stand and support through elbow for the task; might do better with long handled utensils and universal cuff; not open to suggestion this session. Also, reviewed with pt current functioning level and different d/c dispostion; reported he can get more help at home and wants to outpatient therapy. Will continue to follow and to determine pt's' d/c needs.  Plan of Care: Will benefit from Occupational Therapy 3 times per week  Discharge Recommendations:  Equipment Will Continue to Assess for Equipment Needs. Post-acute therapy recommended before discharged home.    See \"Rehab Therapy-Acute\" Patient Summary Report for complete documentation.   "

## 2017-01-04 NOTE — DISCHARGE PLANNING
Aware of Rehab referral/consult from Dr. Cuenca Transitional Care Coordinator to follow. Dr. Gallardo to consult at this time patient is showing to have Medicare as their coverage.

## 2017-01-04 NOTE — DISCHARGE PLANNING
Transitional Care Navigator:    TCN met with patient to discuss transitional care services for discharge planning.  Pt lives alone and has CG support 4 hrs per day through [facility] for ADL's.  Pt reports feeling weaker than baseline.  SNF vs IP Rehab discussed.  Pt refused SNF and prefers rehab or home health.  Dr. Cuenca present during conversation and will request a rehab consult.  Pt in agreement.  TCN will continue to follow for physiatry recommendations and choice.  Sw aware.

## 2017-01-04 NOTE — PROGRESS NOTES
Tita from Lab called with critical result of CPK of 2324 at 09:30. Critical lab result read back to Tita.   Dr. Cuenca notified of critical lab result at 10:15.  Critical lab result read back by Dr. Cuenca.

## 2017-01-04 NOTE — DISCHARGE PLANNING
Current documentation indicates a potential for acute inpatient rehabilitation. Please consider a rehabilitation consult/referral to assist with discharge planning. [id number]

## 2017-01-04 NOTE — CARE PLAN
Problem: Safety  Goal: Will remain free from falls  Intervention: Implement fall precautions  Bed alarm on, call light in reach, DME kept in bathroom when not in use.       Problem: Pain Management  Goal: Pain level will decrease to patient’s comfort goal  Intervention: Follow pain managment plan developed in collaboration with patient and Interdisciplinary Team  Pt will report pain of less than 4/10.

## 2017-01-04 NOTE — THERAPY
Physical Therapy Treatment completed.     Pt appears improved compared to yesterday and stood at CGA while pulling up on scooter. Scooter does not have armrests which is concern for safety, especialy falls. Long discussion with pt regarding DC plan, seating options, and overall safety. Pt could benefit from custom power chair with joystick, but pt concerned that he cannot fit it through his doors at home (has modular home). Pt insistent about not being placed and would prefer OP therapy. Discussed [facility] with pt as well. Pt is somewhat tangential.

## 2017-01-04 NOTE — PROGRESS NOTES
Call out to Alexandra MACARIO to report uncontrolled pain, pt's inability to get sleep due to pain, and home med terazosin never ordered and pt has been hypertensive. One time dose of Morphine in addition to regular prn doses given by APN, and low dose Ambien given for sleep. No further pain meds ordered at this time as this needs to be discussed with  in am per RENALDO. Terazosin ordered.
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
## 2017-01-05 ENCOUNTER — HOME HEALTH ADMISSION (OUTPATIENT)
Dept: HOME HEALTH SERVICES | Facility: HOME HEALTHCARE | Age: 62
End: 2017-01-05
Payer: MEDICARE

## 2017-01-05 VITALS
OXYGEN SATURATION: 94 % | RESPIRATION RATE: 22 BRPM | TEMPERATURE: 98.3 F | SYSTOLIC BLOOD PRESSURE: 140 MMHG | DIASTOLIC BLOOD PRESSURE: 81 MMHG | WEIGHT: 184.97 LBS | HEIGHT: 69 IN | BODY MASS INDEX: 27.4 KG/M2 | HEART RATE: 59 BPM

## 2017-01-05 PROCEDURE — A9270 NON-COVERED ITEM OR SERVICE: HCPCS | Performed by: HOSPITALIST

## 2017-01-05 PROCEDURE — 700111 HCHG RX REV CODE 636 W/ 250 OVERRIDE (IP): Performed by: FAMILY MEDICINE

## 2017-01-05 PROCEDURE — 700102 HCHG RX REV CODE 250 W/ 637 OVERRIDE(OP): Performed by: INTERNAL MEDICINE

## 2017-01-05 PROCEDURE — 700102 HCHG RX REV CODE 250 W/ 637 OVERRIDE(OP): Performed by: HOSPITALIST

## 2017-01-05 PROCEDURE — 700105 HCHG RX REV CODE 258: Performed by: HOSPITALIST

## 2017-01-05 PROCEDURE — 700102 HCHG RX REV CODE 250 W/ 637 OVERRIDE(OP): Performed by: FAMILY MEDICINE

## 2017-01-05 PROCEDURE — 99239 HOSP IP/OBS DSCHRG MGMT >30: CPT | Performed by: HOSPITALIST

## 2017-01-05 PROCEDURE — A9270 NON-COVERED ITEM OR SERVICE: HCPCS | Performed by: FAMILY MEDICINE

## 2017-01-05 PROCEDURE — A9270 NON-COVERED ITEM OR SERVICE: HCPCS | Performed by: INTERNAL MEDICINE

## 2017-01-05 RX ORDER — HYDRALAZINE HYDROCHLORIDE 25 MG/1
25 TABLET, FILM COATED ORAL EVERY 8 HOURS
Status: DISCONTINUED | OUTPATIENT
Start: 2017-01-05 | End: 2017-01-05 | Stop reason: HOSPADM

## 2017-01-05 RX ORDER — HYDRALAZINE HYDROCHLORIDE 25 MG/1
25 TABLET, FILM COATED ORAL EVERY 8 HOURS
Qty: 90 TAB | Refills: 1 | Status: SHIPPED | OUTPATIENT
Start: 2017-01-05 | End: 2017-02-17 | Stop reason: SDUPTHER

## 2017-01-05 RX ADMIN — OXYCODONE HYDROCHLORIDE AND ACETAMINOPHEN 500 MG: 500 TABLET ORAL at 07:48

## 2017-01-05 RX ADMIN — BACLOFEN 20 MG: 10 TABLET ORAL at 00:50

## 2017-01-05 RX ADMIN — ENOXAPARIN SODIUM 40 MG: 100 INJECTION SUBCUTANEOUS at 07:47

## 2017-01-05 RX ADMIN — BETHANECHOL CHLORIDE 25 MG: 25 TABLET ORAL at 11:39

## 2017-01-05 RX ADMIN — BETHANECHOL CHLORIDE 25 MG: 25 TABLET ORAL at 00:50

## 2017-01-05 RX ADMIN — BETHANECHOL CHLORIDE 25 MG: 25 TABLET ORAL at 17:02

## 2017-01-05 RX ADMIN — GABAPENTIN 1200 MG: 400 CAPSULE ORAL at 11:39

## 2017-01-05 RX ADMIN — SODIUM CHLORIDE: 9 INJECTION, SOLUTION INTRAVENOUS at 00:53

## 2017-01-05 RX ADMIN — BETHANECHOL CHLORIDE 25 MG: 25 TABLET ORAL at 05:29

## 2017-01-05 RX ADMIN — MORPHINE SULFATE 2 MG: 4 INJECTION INTRAVENOUS at 15:00

## 2017-01-05 RX ADMIN — BACLOFEN 20 MG: 10 TABLET ORAL at 11:39

## 2017-01-05 RX ADMIN — BACLOFEN 20 MG: 10 TABLET ORAL at 05:24

## 2017-01-05 RX ADMIN — GABAPENTIN 1200 MG: 400 CAPSULE ORAL at 17:02

## 2017-01-05 RX ADMIN — MORPHINE SULFATE 2 MG: 4 INJECTION INTRAVENOUS at 05:23

## 2017-01-05 RX ADMIN — GABAPENTIN 1200 MG: 400 CAPSULE ORAL at 05:23

## 2017-01-05 RX ADMIN — MORPHINE SULFATE 2 MG: 4 INJECTION INTRAVENOUS at 10:23

## 2017-01-05 RX ADMIN — BACITRACIN ZINC AND POLYMYXIN B SULFATE: 500; 10000 OINTMENT TOPICAL at 07:48

## 2017-01-05 RX ADMIN — GABAPENTIN 1200 MG: 400 CAPSULE ORAL at 00:50

## 2017-01-05 RX ADMIN — HYDRALAZINE HYDROCHLORIDE 25 MG: 25 TABLET ORAL at 07:47

## 2017-01-05 RX ADMIN — LEVOTHYROXINE SODIUM 100 MCG: 50 TABLET ORAL at 05:23

## 2017-01-05 RX ADMIN — HYDROCODONE BITARTRATE AND ACETAMINOPHEN 2 TABLET: 10; 325 TABLET ORAL at 11:01

## 2017-01-05 RX ADMIN — BACLOFEN 20 MG: 10 TABLET ORAL at 17:02

## 2017-01-05 ASSESSMENT — PAIN SCALES - GENERAL
PAINLEVEL_OUTOF10: 9
PAINLEVEL_OUTOF10: 9
PAINLEVEL_OUTOF10: 10

## 2017-01-05 NOTE — PROGRESS NOTES
Pt a+ox4, complaints of generalized body pain- medicated per MAR. Pt assisted to chair- chair alarm on. Pt refusing rehab- awaiting for home health care to be approved. Patient very demanding with nursing staff. Patient upset that he cannot stand up on own - safety risks explained. CPKs trending down-  Fluids continue to be infused.   --reviewed discharge paperwork with patient- patient verbalize understanding. Home health care approved. Pt being picked up by friends. ivs removed. Prescription given to patient --pt left floor in stable condition w/ friends- refused hospital escort.
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
## 2017-01-05 NOTE — FACE TO FACE
Face to Face Supporting Documentation - Home Health    The encounter with this patient was in whole or in part the primary reason for home health admission.    Date of encounter:   Patient:                    MRN:                       YOB: 2018  Jesus Pina  [id number]  1955     Home health to see patient for:  Skilled Nursing care for assessment, interventions & education    Skilled need for:  New Onset Medical Diagnosis rhabdomyolysis    Skilled nursing interventions to include:  Comment: Pt education and PT/OT    Homebound status evidenced by:  Needs the assistance of another person in order to leave the home. Leaving home requires a considerable and taxing effort. There is a normal inability to leave the home.    Community Physician to provide follow up care: AALIYAH Dewey     Optional Interventions? No      I certify the face to face encounter for this home health care referral meets the CMS requirements and the encounter/clinical assessment with the patient was, in whole, or in part, for the medical condition(s) listed above, which is the primary reason for home health care. Based on my clinical findings: the service(s) are medically necessary, support the need for home health care, and the homebound criteria are met.  I certify that this patient has had a face to face encounter by myself.  Juan Cuenca M.D. - NPI: [id number]

## 2017-01-05 NOTE — CARE PLAN
Problem: Safety  Goal: Will remain free from injury  Intervention: Provide assistance with mobility  Patient assist x2 up to chair. Chair alarm on

## 2017-01-05 NOTE — DISCHARGE INSTRUCTIONS
1. Home health  2. Regular diet.  3. Gradual increase in activity.  4/. F/U c Dr. CAITY Keane (PCP) 2-3 wks and HH as to be arranged.        Rhabdomyolysis  Rhabdomyolysis is a condition that results when muscle cells break down and release substances into the blood that can damage the kidneys. It happens because of damage to the muscles that move bones (skeletal muscle). When you damage this type of muscle, substances inside of your muscle cells are released into your blood. This includes a certain protein called myoglobin.  Your kidneys must filter myoglobin from your blood. Large amounts of myoglobin can cause kidney damage or kidney failure. Other substances that are released by muscle cells may upset the balance of the minerals (electrolytes) in your blood. This makes your blood become too acidic (acidosis).  CAUSES  This condition is caused by muscle damage. Muscle damage often results from:  · Extreme overuse of the muscles.  · An injury that crushes or compresses a muscle.  · Use of illegal drugs, especially cocaine.  · Alcohol abuse.  Other possible causes include:  · Prescription medicines, such as statins, amphetamines, and opiates.  · Infections.  · Inherited muscle diseases.  · High fever.  · Heatstroke.  · Dehydration.  · Seizures.  · Surgery.  RISK FACTORS  This condition is more likely to develop in:  · People who have a family history of muscle disease.  · People who participate in extreme sports, such as marathon running.  · People who have diabetes.  · Older people.  · People who abuse drugs or alcohol.  SYMPTOMS  Symptoms of this condition vary. Some people have very few symptoms, while others have many symptoms. The most common symptoms include:  · Muscle pain and swelling.  · Muscle weakness.  · Dark urine.  · Feeling weak and tired.  Other symptoms include:  · Nausea and vomiting.  · Fever.  · Pain in the abdomen.  · Joint pain.  Signs and symptoms of complications from rhabdomyolysis may  include:  · Heart rhythm abnormalities (arrhythmias).  · Seizures.  · Reduced urine production because of kidney failure.  · Very low blood pressure (shock).  · Uncontrolled bleeding.  DIAGNOSIS  This condition may be diagnosed based on:  · Your symptoms and medical history.  · A physical exam.  · Blood tests to check for:  ¨ Muscle breakdown products in the blood (creatine kinase).  ¨ Myoglobin.  ¨ Acidosis.  ¨ Electrolyte imbalances.  · Urine tests to check for myoglobin.  You may also have other tests to check for causes of muscle damage and to check for complications.  TREATMENT  Treatment for this condition focuses on keeping up your fluid level, reversing acidosis, and protecting your kidneys. Treatment may include:  · Fluids and medicines given through an IV tube that is inserted into one of your veins.  · Medicines, such as:  ¨ Sodium bicarbonate to reduce acidosis.  ¨ Electrolytes to restore the balance of these minerals in your body.  · Hemodialysis. This treatment uses an artificial kidney machine to filter your blood while you recover. You may have this if other treatments are not helping.  HOME CARE INSTRUCTIONS  · Take medicines only as directed by your health care provider.  · Rest at home until your health care provider says that you can return to your normal activities.  · Drink enough fluid to keep your urine clear or pale yellow.  · Do not exercise with great energy and effort (strenuously). Ask your health care provider what level of exercise is safe for you.  · Do not abuse drugs or alcohol. If you are struggling with drug or alcohol use, ask your health care provider for help.  · Keep all follow-up visits as directed by your health care provider. This is important.  SEEK MEDICAL CARE IF:  · You develop symptoms of rhabdomyolysis at home after treatment.  SEEK IMMEDIATE MEDICAL CARE IF:  · You have a seizure.  · You bleed easily or cannot control bleeding.  · You cannot make urine.  · You have  chest pain.  · You have trouble breathing.     This information is not intended to replace advice given to you by your health care provider. Make sure you discuss any questions you have with your health care provider.     Document Released: 11/30/2005 Document Revised: 05/03/2016 Document Reviewed: 12/23/2015  [employer] Interactive Patient Education ©2016 [employer]      Discharge Instructions    Discharged to home by car with friend. Discharged via wheelchair, hospital escort: Yes.  Special equipment needed: Wheelchair    Be sure to schedule a follow-up appointment with your primary care doctor or any specialists as instructed.     Discharge Plan:   Diet Plan: Discussed  Activity Level: Discussed  Confirmed Follow up Appointment: Patient to Call and Schedule Appointment  Confirmed Symptoms Management: Discussed  Medication Reconciliation Updated: Yes  Influenza Vaccine Indication: Patient Refuses    I understand that a diet low in cholesterol, fat, and sodium is recommended for good health. Unless I have been given specific instructions below for another diet, I accept this instruction as my diet prescription.   Other diet: regular     Special Instructions: None    · Is patient discharged on Warfarin / Coumadin?   No     · Is patient Post Blood Transfusion?  No    Depression / Suicide Risk    As you are discharged from this [facility] facility, it is important to learn how to keep safe from harming yourself.    Recognize the warning signs:  · Abrupt changes in personality, positive or negative- including increase in energy   · Giving away possessions  · Change in eating patterns- significant weight changes-  positive or negative  · Change in sleeping patterns- unable to sleep or sleeping all the time   · Unwillingness or inability to communicate  · Depression  · Unusual sadness, discouragement and loneliness  · Talk of wanting to die  · Neglect of personal appearance   · Rebelliousness- reckless  behavior  · Withdrawal from people/activities they love  · Confusion- inability to concentrate     If you or a loved one observes any of these behaviors or has concerns about self-harm, here's what you can do:  · Talk about it- your feelings and reasons for harming yourself  · Remove any means that you might use to hurt yourself (examples: pills, rope, extension cords, firearm)  · Get professional help from the community (Mental Health, Substance Abuse, psychological counseling)  · Do not be alone:Call your Safe Contact- someone whom you trust who will be there for you.  · Call your local CRISIS HOTLINE 923-9409 or 499-438-8393  · Call your local Children's Mobile Crisis Response Team [place] (444) 067-6773 or www.Webbynode  · Call the toll free National Suicide Prevention Hotlines   · National Suicide Prevention Lifeline 449-994-DGQI (9961)  · National Hope Line Network 800-SUICIDE (622-6232)

## 2017-01-05 NOTE — DISCHARGE SUMMARY
Since the transfer summary was dictated on 1/4/2017 patient remained in the hospital awaiting Rehab disposition, remaining hemodynamically stable.  The patient changed his mind about going to Rehab and wanted to go home.  Home Health has been requested.  Hydralazine was added to the DC med list for the HTN.  He is to continue with the medication and follow up as outlined in the previous summary.    Total DC time process 43 min.

## 2017-01-05 NOTE — DISCHARGE PLANNING
Transitional Care Navigator:    TCN met with patient to discuss results of physiatry consult and recommendation for IP Rehab.  Pt is refusing IP Rehab at this time.  When TCN arrived in patient's room pt was talking on the phone with a friend working on setting up hired CG's for the evening.  Pt would prefer to go home with home health and increase the number of hours of CG's at home.  If MD agrees with d/c home, pt would like to use [facility] services for RN/PT/OT and transition to OP therapy when appropriate.  Choice form faxed to [facility]. Pending order for home health.  Sw aware.  TCN will follow-up as needed.

## 2017-01-06 ENCOUNTER — HOME CARE VISIT (OUTPATIENT)
Dept: HOME HEALTH SERVICES | Facility: HOME HEALTHCARE | Age: 62
End: 2017-01-06
Payer: MEDICARE

## 2017-01-06 PROCEDURE — 665998 HH PPS REVENUE CREDIT

## 2017-01-06 PROCEDURE — A6196 ALGINATE DRESSING <=16 SQ IN: HCPCS

## 2017-01-06 PROCEDURE — A5120 SKIN BARRIER, WIPE OR SWAB: HCPCS

## 2017-01-06 PROCEDURE — A6216 NON-STERILE GAUZE<=16 SQ IN: HCPCS

## 2017-01-06 PROCEDURE — G0162 HHC RN E&M PLAN SVS, 15 MIN: HCPCS

## 2017-01-06 PROCEDURE — A6250 SKIN SEAL PROTECT MOISTURIZR: HCPCS

## 2017-01-06 PROCEDURE — 665001 SOC-HOME HEALTH

## 2017-01-06 PROCEDURE — 665999 HH PPS REVENUE DEBIT

## 2017-01-06 PROCEDURE — A4450 NON-WATERPROOF TAPE: HCPCS

## 2017-01-07 PROCEDURE — 665999 HH PPS REVENUE DEBIT

## 2017-01-07 PROCEDURE — 665998 HH PPS REVENUE CREDIT

## 2017-01-07 SDOH — ECONOMIC STABILITY: HOUSING INSECURITY
HOME SAFETY: INSTRUCT IN PLANNING AND EXECUTION OF DISASTER/EVACUATION PLAN. ASSIST PATIENT IN DEVELOPMENT OR REVISING OF PLAN AS INDICATED. PATIENT IS AT RISK DUE TO LIVING ALONE, HAS EXTREME DIFFICULTY TRANSFERRING SELF FROM BED TO CHAIRINSTRUCT INSTRUCT PATIEN

## 2017-01-07 SDOH — ECONOMIC STABILITY: HOUSING INSECURITY
HOME SAFETY: FLUID INTAKE.  PATIENT ON STRATEGIES/MODIFICATIONS TO HOME ENVIRONMENT.  PATIENT IS TO FOLLOW UP WITH COUNTY CASE MANAGER AND FRIENDS FOR MORE CG ASSISTANCE. INSTRUCTED ON NEED FOR 24 HOUR CG, EVACUALTION PLAN

## 2017-01-07 SDOH — ECONOMIC STABILITY: HOUSING INSECURITY
HOME SAFETY: PROPER HAND-WASHING TECHNIQUES, STANDARD PRECAUTIONS, AVOID CROWDS AND PERSONS WITH KNOWN INFECTIONS, STAYING CURRENT WITH IMMUNIZATIONS, S/S OF INFECTION, SAFE DISPOSAL OF SUPPLIES, SAFE USE AND CLEANING OF EQUIPMENT AND ENCOURAGE ADEQUATE DIET AND

## 2017-01-07 SDOH — ECONOMIC STABILITY: HOUSING INSECURITY: UNSAFE APPLIANCES: 0

## 2017-01-07 SDOH — ECONOMIC STABILITY: HOUSING INSECURITY: UNSAFE COOKING RANGE AREA: 0

## 2017-01-07 SDOH — ECONOMIC STABILITY: HOUSING INSECURITY
HOME SAFETY: T IN STRATEGIES TO PREVENT INFECTION:INSTRUCT PATIENT IN STRATEGIES TO PREVENT INJURY - MODIFICATIONS TO THE HOME ENVIRONMENT, DECREASE CLUTTER, FREQUENT TURNING/REPOSITIONING, NOT TO USE ICE/HEAT DIRECTLY ON THE SKIN, CHOKING PRECAUTIONS.  FREQUENT/

## 2017-01-07 ASSESSMENT — ACTIVITIES OF DAILY LIVING (ADL)
HOME_HEALTH_OASIS: 01
OASIS_M1830: 06
HOME_HEALTH_OASIS: 03

## 2017-01-07 ASSESSMENT — PATIENT HEALTH QUESTIONNAIRE - PHQ9
2. FEELING DOWN, DEPRESSED, IRRITABLE, OR HOPELESS: 01
1. LITTLE INTEREST OR PLEASURE IN DOING THINGS: 00

## 2017-01-08 ENCOUNTER — HOME CARE VISIT (OUTPATIENT)
Dept: HOME HEALTH SERVICES | Facility: HOME HEALTHCARE | Age: 62
End: 2017-01-08
Payer: MEDICARE

## 2017-01-08 PROCEDURE — G0299 HHS/HOSPICE OF RN EA 15 MIN: HCPCS

## 2017-01-08 PROCEDURE — 665999 HH PPS REVENUE DEBIT

## 2017-01-08 PROCEDURE — 665998 HH PPS REVENUE CREDIT

## 2017-01-09 VITALS
HEART RATE: 84 BPM | DIASTOLIC BLOOD PRESSURE: 80 MMHG | TEMPERATURE: 97 F | RESPIRATION RATE: 20 BRPM | SYSTOLIC BLOOD PRESSURE: 122 MMHG

## 2017-01-09 PROCEDURE — 665998 HH PPS REVENUE CREDIT

## 2017-01-09 PROCEDURE — 665999 HH PPS REVENUE DEBIT

## 2017-01-09 SDOH — ECONOMIC STABILITY: HOUSING INSECURITY: UNSAFE COOKING RANGE AREA: 0

## 2017-01-09 SDOH — ECONOMIC STABILITY: HOUSING INSECURITY: UNSAFE APPLIANCES: 0

## 2017-01-09 ASSESSMENT — ACTIVITIES OF DAILY LIVING (ADL)
DRESSING_UB_ASSISTANCE: 5
HOUSEKEEPING_ASSISTANCE: 6
TELEPHONE_ASSISTANCE: 0
LAUNDRY_ASSISTANCE: 6
BATHING_ASSISTANCE: 5
TOILETING_ASSISTANCE: 5
EATING_ASSISTANCE: 0
SHOPPING_ASSISTANCE: 6
DRESSING_LB_ASSISTANCE: 5
ORAL_CARE_ASSISTANCE: 5
MEAL_PREP_ASSISTANCE: 5
TRANSPORTATION_ASSISTANCE: 6
GROOMING_ASSISTANCE: 5
TRANSPORTATION COMMENTS: POTENTIAL FOR FALLS

## 2017-01-09 ASSESSMENT — ENCOUNTER SYMPTOMS: DEPRESSED MOOD: 1

## 2017-01-10 ENCOUNTER — HOME CARE VISIT (OUTPATIENT)
Dept: HOME HEALTH SERVICES | Facility: HOME HEALTHCARE | Age: 62
End: 2017-01-10
Payer: MEDICARE

## 2017-01-10 PROCEDURE — G0300 HHS/HOSPICE OF LPN EA 15 MIN: HCPCS

## 2017-01-10 PROCEDURE — 665999 HH PPS REVENUE DEBIT

## 2017-01-10 PROCEDURE — 665998 HH PPS REVENUE CREDIT

## 2017-01-11 ENCOUNTER — HOME CARE VISIT (OUTPATIENT)
Dept: HOME HEALTH SERVICES | Facility: HOME HEALTHCARE | Age: 62
End: 2017-01-11
Payer: MEDICARE

## 2017-01-11 ENCOUNTER — TELEPHONE (OUTPATIENT)
Dept: MEDICAL GROUP | Facility: PHYSICIAN GROUP | Age: 62
End: 2017-01-11

## 2017-01-11 VITALS — TEMPERATURE: 98.4 F | HEART RATE: 54 BPM | RESPIRATION RATE: 20 BRPM

## 2017-01-11 DIAGNOSIS — F51.02 ADJUSTMENT INSOMNIA: ICD-10-CM

## 2017-01-11 PROCEDURE — 665999 HH PPS REVENUE DEBIT

## 2017-01-11 PROCEDURE — 665998 HH PPS REVENUE CREDIT

## 2017-01-11 RX ORDER — ZOLPIDEM TARTRATE 5 MG/1
5 TABLET ORAL NIGHTLY PRN
Qty: 30 TAB | Refills: 0 | Status: SHIPPED
Start: 2017-01-11 | End: 2017-02-17 | Stop reason: SDUPTHER

## 2017-01-11 NOTE — TELEPHONE ENCOUNTER
Please let patient know that I am happy to help him with a sleep aid. We can try one month of Ambien, however, please let him know that he can only take 5 mg and may not move to 10 mg as he is already on multiple other medications like Norco and Lyrica. He is a high risk to fall as is. Please let him know that if he is having a lot of pain, he needs to call the pain physician because I am unable to do anything with his pain contract in place with the pain doctor. Thank you.    JODIE Dewey.

## 2017-01-11 NOTE — TELEPHONE ENCOUNTER
----- Message from Sg Smith L.P.N. sent at 1/11/2017  5:28 AM PST -----  HI   Mr Pina, Jesus [birth year]  is having a lot of pain 7/10  and he is also having problems with sleep and we were wondering if he can get an sleep aid.    He dose not want trazodone  it  dose not work. for him   Ambien 5 mg  work for him the last time he had it   sg

## 2017-01-12 ENCOUNTER — HOME CARE VISIT (OUTPATIENT)
Dept: HOME HEALTH SERVICES | Facility: HOME HEALTHCARE | Age: 62
End: 2017-01-12
Payer: MEDICARE

## 2017-01-12 ENCOUNTER — TELEPHONE (OUTPATIENT)
Dept: MEDICAL GROUP | Facility: PHYSICIAN GROUP | Age: 62
End: 2017-01-12

## 2017-01-12 DIAGNOSIS — S14.109S QUADRIPLEGIA, POST-TRAUMATIC (HCC): ICD-10-CM

## 2017-01-12 DIAGNOSIS — G82.50 QUADRIPLEGIA, POST-TRAUMATIC (HCC): ICD-10-CM

## 2017-01-12 PROCEDURE — 665999 HH PPS REVENUE DEBIT

## 2017-01-12 PROCEDURE — 665998 HH PPS REVENUE CREDIT

## 2017-01-13 ENCOUNTER — HOME CARE VISIT (OUTPATIENT)
Dept: HOME HEALTH SERVICES | Facility: HOME HEALTHCARE | Age: 62
End: 2017-01-13
Payer: MEDICARE

## 2017-01-13 VITALS
RESPIRATION RATE: 16 BRPM | SYSTOLIC BLOOD PRESSURE: 136 MMHG | TEMPERATURE: 98 F | HEART RATE: 56 BPM | DIASTOLIC BLOOD PRESSURE: 90 MMHG

## 2017-01-13 VITALS
SYSTOLIC BLOOD PRESSURE: 130 MMHG | TEMPERATURE: 98.4 F | DIASTOLIC BLOOD PRESSURE: 85 MMHG | RESPIRATION RATE: 20 BRPM | HEART RATE: 55 BPM

## 2017-01-13 PROCEDURE — 665999 HH PPS REVENUE DEBIT

## 2017-01-13 PROCEDURE — G0299 HHS/HOSPICE OF RN EA 15 MIN: HCPCS

## 2017-01-13 PROCEDURE — 665998 HH PPS REVENUE CREDIT

## 2017-01-13 PROCEDURE — A6197 ALGINATE DRSG >16 <=48 SQ IN: HCPCS

## 2017-01-13 PROCEDURE — G0152 HHCP-SERV OF OT,EA 15 MIN: HCPCS

## 2017-01-13 PROCEDURE — A6214 FOAM DRG > 48 SQ IN W/BORDER: HCPCS

## 2017-01-13 PROCEDURE — A4450 NON-WATERPROOF TAPE: HCPCS

## 2017-01-13 SDOH — ECONOMIC STABILITY: HOUSING INSECURITY: UNSAFE APPLIANCES: 0

## 2017-01-13 SDOH — ECONOMIC STABILITY: HOUSING INSECURITY: UNSAFE COOKING RANGE AREA: 0

## 2017-01-13 SDOH — ECONOMIC STABILITY: HOUSING INSECURITY: HOME SAFETY: DOUBT PT COULD USE FIRE EXTINGUISHER IF HE HAD ONE,

## 2017-01-13 ASSESSMENT — ACTIVITIES OF DAILY LIVING (ADL)
DRESSING_UB_ASSISTANCE: 0
MEAL_PREP_ASSISTANCE: 0
LAUNDRY_ASSISTANCE: 0
EATING_ASSISTANCE: 0
HOUSEKEEPING_ASSISTANCE: 5
TOILETING_ASSISTANCE: 0
GROOMING_ASSISTANCE: 0
BATHING_ASSISTANCE: 0
DRESSING_LB_ASSISTANCE: 0
TRANSPORTATION_ASSISTANCE: 6
SHOPPING_ASSISTANCE: 6
TOILETING_EQUIPMENT_USED: BEDSIDE COMMODE
TELEPHONE_ASSISTANCE: 0
ORAL_CARE_ASSISTANCE: 0

## 2017-01-13 ASSESSMENT — ENCOUNTER SYMPTOMS
NAUSEA: DENIES
DEBILITATING PAIN: 1
VOMITING: DENIES

## 2017-01-13 NOTE — TELEPHONE ENCOUNTER
----- Message from Joan K Ormonde, P.T. sent at 1/12/2017 10:20 AM PST -----  Pt declined home PT. States he is doing fine at home but wants to go to OP PT at a [facility] facility, Discussed inpatient acute rehabilitation option,pt declined stating he will not go anywhere that he has to stay overnight. Pt requesting SS assistance as well as a new tub transfer bench (his is broken).

## 2017-01-13 NOTE — TELEPHONE ENCOUNTER
Pt. Notified. He needs a shower bench and a portable toilet would be great then he could transfer from the chair to the toilet. It is hard to get to his restroom.

## 2017-01-13 NOTE — TELEPHONE ENCOUNTER
Please let patient know that I have placed a referral for [facility] as outpatient. Please clarify what equipment he needs at home and can we get this paperwork together please?    JODIE Dewey.

## 2017-01-14 PROCEDURE — 665998 HH PPS REVENUE CREDIT

## 2017-01-14 PROCEDURE — 665999 HH PPS REVENUE DEBIT

## 2017-01-15 PROCEDURE — 665999 HH PPS REVENUE DEBIT

## 2017-01-15 PROCEDURE — 665998 HH PPS REVENUE CREDIT

## 2017-01-16 ENCOUNTER — HOME CARE VISIT (OUTPATIENT)
Dept: HOME HEALTH SERVICES | Facility: HOME HEALTHCARE | Age: 62
End: 2017-01-16
Payer: MEDICARE

## 2017-01-16 VITALS
RESPIRATION RATE: 20 BRPM | HEART RATE: 75 BPM | SYSTOLIC BLOOD PRESSURE: 128 MMHG | TEMPERATURE: 99.7 F | HEIGHT: 67 IN | DIASTOLIC BLOOD PRESSURE: 80 MMHG

## 2017-01-16 VITALS — TEMPERATURE: 97.7 F | HEART RATE: 44 BPM | RESPIRATION RATE: 16 BRPM

## 2017-01-16 PROCEDURE — 665999 HH PPS REVENUE DEBIT

## 2017-01-16 PROCEDURE — G0299 HHS/HOSPICE OF RN EA 15 MIN: HCPCS

## 2017-01-16 PROCEDURE — A6197 ALGINATE DRSG >16 <=48 SQ IN: HCPCS

## 2017-01-16 PROCEDURE — A6214 FOAM DRG > 48 SQ IN W/BORDER: HCPCS

## 2017-01-16 PROCEDURE — 665998 HH PPS REVENUE CREDIT

## 2017-01-16 SDOH — ECONOMIC STABILITY: HOUSING INSECURITY: UNSAFE APPLIANCES: 0

## 2017-01-16 SDOH — ECONOMIC STABILITY: HOUSING INSECURITY: UNSAFE COOKING RANGE AREA: 0

## 2017-01-16 ASSESSMENT — ENCOUNTER SYMPTOMS: MENTAL STATUS CHANGE: 0

## 2017-01-16 NOTE — TELEPHONE ENCOUNTER
It sounds like [facility] got him a portable commode. If we can clarify with him if this in fact did happen, then please get papers ready for me to sign for shower bench. Or do I just write a regular order for it?    JODIE Dewey.

## 2017-01-17 ENCOUNTER — HOME CARE VISIT (OUTPATIENT)
Dept: HOME HEALTH SERVICES | Facility: HOME HEALTHCARE | Age: 62
End: 2017-01-17
Payer: MEDICARE

## 2017-01-17 PROCEDURE — 665998 HH PPS REVENUE CREDIT

## 2017-01-17 PROCEDURE — 665999 HH PPS REVENUE DEBIT

## 2017-01-18 ENCOUNTER — HOME CARE VISIT (OUTPATIENT)
Dept: HOME HEALTH SERVICES | Facility: HOME HEALTHCARE | Age: 62
End: 2017-01-18
Payer: MEDICARE

## 2017-01-18 PROCEDURE — 665998 HH PPS REVENUE CREDIT

## 2017-01-18 PROCEDURE — 665999 HH PPS REVENUE DEBIT

## 2017-01-18 PROCEDURE — G0300 HHS/HOSPICE OF LPN EA 15 MIN: HCPCS

## 2017-01-18 NOTE — TELEPHONE ENCOUNTER
Order is ready to be sent for shower bench. If patient cannot get someone to grab commode from [facility], I will write order for commode as well.    JODIE Dewey.

## 2017-01-18 NOTE — TELEPHONE ENCOUNTER
Pt has not been able to get the commode.  He has to find a way to pick it up.  I will call to make sure they do not deliver.  And yes, we'll need a written order for the shower bench.

## 2017-01-18 NOTE — TELEPHONE ENCOUNTER
I verified with [facility] that they do not deliver.  They do not have the pt on file either so there is not a commode waiting for him....

## 2017-01-19 VITALS
DIASTOLIC BLOOD PRESSURE: 82 MMHG | HEART RATE: 66 BPM | TEMPERATURE: 98.7 F | SYSTOLIC BLOOD PRESSURE: 136 MMHG | RESPIRATION RATE: 14 BRPM

## 2017-01-19 LAB — EKG IMPRESSION: NORMAL

## 2017-01-19 PROCEDURE — 665999 HH PPS REVENUE DEBIT

## 2017-01-19 PROCEDURE — 665998 HH PPS REVENUE CREDIT

## 2017-01-19 SDOH — ECONOMIC STABILITY: HOUSING INSECURITY: UNSAFE APPLIANCES: 0

## 2017-01-19 SDOH — ECONOMIC STABILITY: HOUSING INSECURITY: UNSAFE COOKING RANGE AREA: 0

## 2017-01-19 ASSESSMENT — ENCOUNTER SYMPTOMS: RESPIRATORY SYMPTOMS COMMENTS: PT LUNGS CLEAR IN ALL FIELDS, NO ADVANTAGEOUS SOUND NOTED.

## 2017-01-20 ENCOUNTER — HOME CARE VISIT (OUTPATIENT)
Dept: HOME HEALTH SERVICES | Facility: HOME HEALTHCARE | Age: 62
End: 2017-01-20
Payer: MEDICARE

## 2017-01-20 VITALS
RESPIRATION RATE: 18 BRPM | HEART RATE: 78 BPM | SYSTOLIC BLOOD PRESSURE: 102 MMHG | DIASTOLIC BLOOD PRESSURE: 60 MMHG | TEMPERATURE: 98.5 F

## 2017-01-20 PROCEDURE — G0299 HHS/HOSPICE OF RN EA 15 MIN: HCPCS

## 2017-01-20 PROCEDURE — 665999 HH PPS REVENUE DEBIT

## 2017-01-20 PROCEDURE — 665998 HH PPS REVENUE CREDIT

## 2017-01-20 SDOH — ECONOMIC STABILITY: HOUSING INSECURITY: UNSAFE APPLIANCES: 0

## 2017-01-20 SDOH — ECONOMIC STABILITY: HOUSING INSECURITY: UNSAFE COOKING RANGE AREA: 0

## 2017-01-20 ASSESSMENT — ENCOUNTER SYMPTOMS
NAUSEA: DENIES
VOMITING: DENIES

## 2017-01-20 ASSESSMENT — ACTIVITIES OF DAILY LIVING (ADL): TRANSPORTATION COMMENTS: QUADRIPLEGIA

## 2017-01-20 NOTE — TELEPHONE ENCOUNTER
Pt has to get accepted for care through [facility].  I have the application and will fill this out for him.  The only thing he has to do is bring me a proof of income and a signature on the paperwork.  When I have this complete, I will send it to [facility].

## 2017-01-21 PROCEDURE — 665999 HH PPS REVENUE DEBIT

## 2017-01-21 PROCEDURE — 665998 HH PPS REVENUE CREDIT

## 2017-01-22 PROCEDURE — 665999 HH PPS REVENUE DEBIT

## 2017-01-22 PROCEDURE — 665998 HH PPS REVENUE CREDIT

## 2017-01-23 PROCEDURE — G0180 MD CERTIFICATION HHA PATIENT: HCPCS | Performed by: FAMILY MEDICINE

## 2017-01-23 PROCEDURE — 665998 HH PPS REVENUE CREDIT

## 2017-01-23 PROCEDURE — 665999 HH PPS REVENUE DEBIT

## 2017-01-24 ENCOUNTER — HOME CARE VISIT (OUTPATIENT)
Dept: HOME HEALTH SERVICES | Facility: HOME HEALTHCARE | Age: 62
End: 2017-01-24
Payer: MEDICARE

## 2017-01-24 VITALS
SYSTOLIC BLOOD PRESSURE: 110 MMHG | TEMPERATURE: 98 F | RESPIRATION RATE: 16 BRPM | DIASTOLIC BLOOD PRESSURE: 60 MMHG | HEART RATE: 82 BPM

## 2017-01-24 PROCEDURE — G0495 RN CARE TRAIN/EDU IN HH: HCPCS

## 2017-01-24 PROCEDURE — A6207 CONTACT LAYER >16<= 48 SQ IN: HCPCS

## 2017-01-24 PROCEDURE — 665999 HH PPS REVENUE DEBIT

## 2017-01-24 PROCEDURE — A4649 SURGICAL SUPPLIES: HCPCS

## 2017-01-24 PROCEDURE — A6219 GAUZE <= 16 SQ IN W/BORDER: HCPCS

## 2017-01-24 PROCEDURE — 6650305 HCR  APPLICATOR Q TIP 6 (100 PKG)"

## 2017-01-24 PROCEDURE — 665998 HH PPS REVENUE CREDIT

## 2017-01-24 SDOH — ECONOMIC STABILITY: HOUSING INSECURITY: UNSAFE APPLIANCES: 0

## 2017-01-24 SDOH — ECONOMIC STABILITY: HOUSING INSECURITY: UNSAFE COOKING RANGE AREA: 0

## 2017-01-25 PROCEDURE — 665998 HH PPS REVENUE CREDIT

## 2017-01-25 PROCEDURE — 665999 HH PPS REVENUE DEBIT

## 2017-01-26 ENCOUNTER — HOME CARE VISIT (OUTPATIENT)
Dept: HOME HEALTH SERVICES | Facility: HOME HEALTHCARE | Age: 62
End: 2017-01-26
Payer: MEDICARE

## 2017-01-26 PROCEDURE — 665998 HH PPS REVENUE CREDIT

## 2017-01-26 PROCEDURE — 665999 HH PPS REVENUE DEBIT

## 2017-01-27 PROCEDURE — 665999 HH PPS REVENUE DEBIT

## 2017-01-27 PROCEDURE — 665998 HH PPS REVENUE CREDIT

## 2017-01-28 ENCOUNTER — HOME CARE VISIT (OUTPATIENT)
Dept: HOME HEALTH SERVICES | Facility: HOME HEALTHCARE | Age: 62
End: 2017-01-28
Payer: MEDICARE

## 2017-01-28 PROCEDURE — 665998 HH PPS REVENUE CREDIT

## 2017-01-28 PROCEDURE — G0300 HHS/HOSPICE OF LPN EA 15 MIN: HCPCS

## 2017-01-28 PROCEDURE — 665999 HH PPS REVENUE DEBIT

## 2017-01-29 PROCEDURE — 665999 HH PPS REVENUE DEBIT

## 2017-01-29 PROCEDURE — 665998 HH PPS REVENUE CREDIT

## 2017-01-30 ENCOUNTER — HOME CARE VISIT (OUTPATIENT)
Dept: HOME HEALTH SERVICES | Facility: HOME HEALTHCARE | Age: 62
End: 2017-01-30
Payer: MEDICARE

## 2017-01-30 VITALS
RESPIRATION RATE: 18 BRPM | SYSTOLIC BLOOD PRESSURE: 122 MMHG | DIASTOLIC BLOOD PRESSURE: 74 MMHG | TEMPERATURE: 98.8 F | HEART RATE: 68 BPM

## 2017-01-30 VITALS
HEART RATE: 66 BPM | SYSTOLIC BLOOD PRESSURE: 112 MMHG | TEMPERATURE: 100 F | RESPIRATION RATE: 15 BRPM | DIASTOLIC BLOOD PRESSURE: 74 MMHG

## 2017-01-30 PROCEDURE — 665998 HH PPS REVENUE CREDIT

## 2017-01-30 PROCEDURE — G0299 HHS/HOSPICE OF RN EA 15 MIN: HCPCS

## 2017-01-30 PROCEDURE — 665999 HH PPS REVENUE DEBIT

## 2017-01-30 PROCEDURE — A4649 SURGICAL SUPPLIES: HCPCS

## 2017-01-30 PROCEDURE — A6212 FOAM DRG <=16 SQ IN W/BORDER: HCPCS

## 2017-01-30 ASSESSMENT — ENCOUNTER SYMPTOMS
NAUSEA: DENIES
VOMITING: DENIES

## 2017-01-31 PROCEDURE — 665998 HH PPS REVENUE CREDIT

## 2017-01-31 PROCEDURE — 665999 HH PPS REVENUE DEBIT

## 2017-02-01 ENCOUNTER — HOME CARE VISIT (OUTPATIENT)
Dept: HOME HEALTH SERVICES | Facility: HOME HEALTHCARE | Age: 62
End: 2017-02-01
Payer: MEDICARE

## 2017-02-01 PROCEDURE — 665998 HH PPS REVENUE CREDIT

## 2017-02-01 PROCEDURE — 665999 HH PPS REVENUE DEBIT

## 2017-02-02 PROCEDURE — 665999 HH PPS REVENUE DEBIT

## 2017-02-02 PROCEDURE — 665998 HH PPS REVENUE CREDIT

## 2017-02-03 ENCOUNTER — HOME CARE VISIT (OUTPATIENT)
Dept: HOME HEALTH SERVICES | Facility: HOME HEALTHCARE | Age: 62
End: 2017-02-03
Payer: MEDICARE

## 2017-02-03 VITALS
DIASTOLIC BLOOD PRESSURE: 80 MMHG | SYSTOLIC BLOOD PRESSURE: 130 MMHG | TEMPERATURE: 98 F | RESPIRATION RATE: 18 BRPM | HEART RATE: 70 BPM

## 2017-02-03 PROCEDURE — 665999 HH PPS REVENUE DEBIT

## 2017-02-03 PROCEDURE — 665998 HH PPS REVENUE CREDIT

## 2017-02-03 PROCEDURE — G0299 HHS/HOSPICE OF RN EA 15 MIN: HCPCS

## 2017-02-03 PROCEDURE — A4349 DISPOSABLE MALE EXTERNAL CAT: HCPCS

## 2017-02-03 PROCEDURE — A6207 CONTACT LAYER >16<= 48 SQ IN: HCPCS

## 2017-02-03 PROCEDURE — A6214 FOAM DRG > 48 SQ IN W/BORDER: HCPCS

## 2017-02-03 SDOH — ECONOMIC STABILITY: HOUSING INSECURITY: UNSAFE APPLIANCES: 0

## 2017-02-03 SDOH — ECONOMIC STABILITY: HOUSING INSECURITY: UNSAFE COOKING RANGE AREA: 0

## 2017-02-03 ASSESSMENT — ENCOUNTER SYMPTOMS
NAUSEA: DENIES
VOMITING: DENIES

## 2017-02-04 PROCEDURE — 665998 HH PPS REVENUE CREDIT

## 2017-02-04 PROCEDURE — 665999 HH PPS REVENUE DEBIT

## 2017-02-05 PROCEDURE — 665998 HH PPS REVENUE CREDIT

## 2017-02-05 PROCEDURE — 665999 HH PPS REVENUE DEBIT

## 2017-02-06 ENCOUNTER — HOME CARE VISIT (OUTPATIENT)
Dept: HOME HEALTH SERVICES | Facility: HOME HEALTHCARE | Age: 62
End: 2017-02-06
Payer: MEDICARE

## 2017-02-06 PROCEDURE — 665998 HH PPS REVENUE CREDIT

## 2017-02-06 PROCEDURE — 665999 HH PPS REVENUE DEBIT

## 2017-02-06 PROCEDURE — G0300 HHS/HOSPICE OF LPN EA 15 MIN: HCPCS

## 2017-02-07 PROCEDURE — 665998 HH PPS REVENUE CREDIT

## 2017-02-07 PROCEDURE — 665999 HH PPS REVENUE DEBIT

## 2017-02-08 VITALS
RESPIRATION RATE: 18 BRPM | HEART RATE: 64 BPM | DIASTOLIC BLOOD PRESSURE: 78 MMHG | TEMPERATURE: 98.7 F | SYSTOLIC BLOOD PRESSURE: 120 MMHG

## 2017-02-08 PROCEDURE — 665998 HH PPS REVENUE CREDIT

## 2017-02-08 PROCEDURE — 665999 HH PPS REVENUE DEBIT

## 2017-02-08 SDOH — ECONOMIC STABILITY: HOUSING INSECURITY: UNSAFE COOKING RANGE AREA: 0

## 2017-02-08 SDOH — ECONOMIC STABILITY: HOUSING INSECURITY: UNSAFE APPLIANCES: 0

## 2017-02-08 ASSESSMENT — ENCOUNTER SYMPTOMS: RESPIRATORY SYMPTOMS COMMENTS: PT LUNGS CLEAR IN ALL FIELDS, NO ADVANTAGEOUS SOUND NOTED.

## 2017-02-09 ENCOUNTER — HOME CARE VISIT (OUTPATIENT)
Dept: HOME HEALTH SERVICES | Facility: HOME HEALTHCARE | Age: 62
End: 2017-02-09
Payer: MEDICARE

## 2017-02-09 PROCEDURE — 665999 HH PPS REVENUE DEBIT

## 2017-02-09 PROCEDURE — 665998 HH PPS REVENUE CREDIT

## 2017-02-10 PROCEDURE — 665999 HH PPS REVENUE DEBIT

## 2017-02-10 PROCEDURE — 665998 HH PPS REVENUE CREDIT

## 2017-02-10 RX ORDER — BETHANECHOL CHLORIDE 25 MG/1
25 TABLET ORAL 3 TIMES DAILY
Qty: 90 TAB | Refills: 3 | Status: SHIPPED | OUTPATIENT
Start: 2017-02-10 | End: 2017-02-17 | Stop reason: SDUPTHER

## 2017-02-10 NOTE — TELEPHONE ENCOUNTER
Was the patient seen in the last year in this department? Yes     Does patient have an active prescription for medications requested? No     Received Request Via: Pharmacy     Pharmacist is also asking office fax a list of all the patient's medicaiton

## 2017-02-11 ENCOUNTER — HOME CARE VISIT (OUTPATIENT)
Dept: HOME HEALTH SERVICES | Facility: HOME HEALTHCARE | Age: 62
End: 2017-02-11
Payer: MEDICARE

## 2017-02-11 PROCEDURE — G0300 HHS/HOSPICE OF LPN EA 15 MIN: HCPCS

## 2017-02-11 PROCEDURE — 665999 HH PPS REVENUE DEBIT

## 2017-02-11 PROCEDURE — 665998 HH PPS REVENUE CREDIT

## 2017-02-11 SDOH — ECONOMIC STABILITY: HOUSING INSECURITY: UNSAFE COOKING RANGE AREA: 0

## 2017-02-11 SDOH — ECONOMIC STABILITY: HOUSING INSECURITY: UNSAFE APPLIANCES: 0

## 2017-02-11 ASSESSMENT — ENCOUNTER SYMPTOMS: RESPIRATORY SYMPTOMS COMMENTS: PT LUNGS CLEAR IN ALL FIELDS, NO ADVANTAGEOUS SOUND NOTED.

## 2017-02-12 PROCEDURE — 665998 HH PPS REVENUE CREDIT

## 2017-02-12 PROCEDURE — 665999 HH PPS REVENUE DEBIT

## 2017-02-13 ENCOUNTER — HOME CARE VISIT (OUTPATIENT)
Dept: HOME HEALTH SERVICES | Facility: HOME HEALTHCARE | Age: 62
End: 2017-02-13
Payer: MEDICARE

## 2017-02-13 VITALS
SYSTOLIC BLOOD PRESSURE: 110 MMHG | HEART RATE: 67 BPM | TEMPERATURE: 98.7 F | RESPIRATION RATE: 18 BRPM | DIASTOLIC BLOOD PRESSURE: 60 MMHG

## 2017-02-13 PROCEDURE — G0300 HHS/HOSPICE OF LPN EA 15 MIN: HCPCS

## 2017-02-13 PROCEDURE — 665998 HH PPS REVENUE CREDIT

## 2017-02-13 PROCEDURE — 665999 HH PPS REVENUE DEBIT

## 2017-02-14 VITALS
RESPIRATION RATE: 16 BRPM | TEMPERATURE: 98.2 F | DIASTOLIC BLOOD PRESSURE: 78 MMHG | SYSTOLIC BLOOD PRESSURE: 110 MMHG | HEART RATE: 65 BPM

## 2017-02-14 PROCEDURE — 665999 HH PPS REVENUE DEBIT

## 2017-02-14 PROCEDURE — 665998 HH PPS REVENUE CREDIT

## 2017-02-14 SDOH — ECONOMIC STABILITY: HOUSING INSECURITY: UNSAFE APPLIANCES: 0

## 2017-02-14 SDOH — ECONOMIC STABILITY: HOUSING INSECURITY: UNSAFE COOKING RANGE AREA: 0

## 2017-02-14 ASSESSMENT — ENCOUNTER SYMPTOMS
SEVERE DYSPNEA: 1
RESPIRATORY SYMPTOMS COMMENTS: PT LUNGS CLEAR IN ALL FIELDS, NO ADVANTAGEOUS SOUND NOTED.

## 2017-02-15 PROCEDURE — 665998 HH PPS REVENUE CREDIT

## 2017-02-15 PROCEDURE — 665999 HH PPS REVENUE DEBIT

## 2017-02-16 ENCOUNTER — HOME CARE VISIT (OUTPATIENT)
Dept: HOME HEALTH SERVICES | Facility: HOME HEALTHCARE | Age: 62
End: 2017-02-16
Payer: MEDICARE

## 2017-02-16 VITALS
DIASTOLIC BLOOD PRESSURE: 84 MMHG | RESPIRATION RATE: 16 BRPM | HEART RATE: 62 BPM | TEMPERATURE: 99.8 F | SYSTOLIC BLOOD PRESSURE: 120 MMHG

## 2017-02-16 PROCEDURE — 665998 HH PPS REVENUE CREDIT

## 2017-02-16 PROCEDURE — A6212 FOAM DRG <=16 SQ IN W/BORDER: HCPCS

## 2017-02-16 PROCEDURE — A4216 STERILE WATER/SALINE, 10 ML: HCPCS

## 2017-02-16 PROCEDURE — A6210 FOAM DRG >16<=48 SQ IN W/O B: HCPCS

## 2017-02-16 PROCEDURE — G0162 HHC RN E&M PLAN SVS, 15 MIN: HCPCS

## 2017-02-16 PROCEDURE — 665999 HH PPS REVENUE DEBIT

## 2017-02-16 ASSESSMENT — ACTIVITIES OF DAILY LIVING (ADL)
HOME_HEALTH_OASIS: 01
OASIS_M1830: 06

## 2017-02-17 DIAGNOSIS — F51.02 ADJUSTMENT INSOMNIA: ICD-10-CM

## 2017-02-17 PROCEDURE — 665999 HH PPS REVENUE DEBIT

## 2017-02-17 PROCEDURE — 665998 HH PPS REVENUE CREDIT

## 2017-02-17 RX ORDER — TERAZOSIN 10 MG/1
10 CAPSULE ORAL DAILY
Qty: 90 CAP | Refills: 3 | Status: SHIPPED | OUTPATIENT
Start: 2017-02-17 | End: 2017-02-22 | Stop reason: SDUPTHER

## 2017-02-17 RX ORDER — ZOLPIDEM TARTRATE 5 MG/1
5 TABLET ORAL NIGHTLY PRN
Qty: 30 TAB | Refills: 0 | Status: SHIPPED
Start: 2017-02-17 | End: 2017-02-22 | Stop reason: SDUPTHER

## 2017-02-17 RX ORDER — HYDRALAZINE HYDROCHLORIDE 25 MG/1
25 TABLET, FILM COATED ORAL EVERY 8 HOURS
Qty: 270 TAB | Refills: 2 | Status: SHIPPED | OUTPATIENT
Start: 2017-02-17 | End: 2017-02-22 | Stop reason: SDUPTHER

## 2017-02-17 RX ORDER — LEVOTHYROXINE SODIUM 0.1 MG/1
100 TABLET ORAL
Qty: 90 TAB | Refills: 2 | Status: SHIPPED | OUTPATIENT
Start: 2017-02-17 | End: 2017-02-22 | Stop reason: SDUPTHER

## 2017-02-17 RX ORDER — PREGABALIN 50 MG/1
50 CAPSULE ORAL 3 TIMES DAILY
Qty: 90 CAP | Refills: 0 | OUTPATIENT
Start: 2017-02-17

## 2017-02-17 RX ORDER — BETHANECHOL CHLORIDE 25 MG/1
25 TABLET ORAL 3 TIMES DAILY
Qty: 270 TAB | Refills: 2 | Status: SHIPPED | OUTPATIENT
Start: 2017-02-17 | End: 2017-02-22 | Stop reason: SDUPTHER

## 2017-02-18 PROCEDURE — 665999 HH PPS REVENUE DEBIT

## 2017-02-18 PROCEDURE — 665998 HH PPS REVENUE CREDIT

## 2017-02-19 PROCEDURE — 665999 HH PPS REVENUE DEBIT

## 2017-02-19 PROCEDURE — 665998 HH PPS REVENUE CREDIT

## 2017-02-20 PROCEDURE — 665999 HH PPS REVENUE DEBIT

## 2017-02-20 PROCEDURE — 665998 HH PPS REVENUE CREDIT

## 2017-02-20 SDOH — ECONOMIC STABILITY: HOUSING INSECURITY: UNSAFE COOKING RANGE AREA: 0

## 2017-02-20 SDOH — ECONOMIC STABILITY: HOUSING INSECURITY: UNSAFE APPLIANCES: 0

## 2017-02-21 PROCEDURE — 665999 HH PPS REVENUE DEBIT

## 2017-02-21 PROCEDURE — 665998 HH PPS REVENUE CREDIT

## 2017-02-22 ENCOUNTER — APPOINTMENT (OUTPATIENT)
Dept: MEDICAL GROUP | Facility: PHYSICIAN GROUP | Age: 62
End: 2017-02-22
Payer: MEDICARE

## 2017-02-22 DIAGNOSIS — F51.02 ADJUSTMENT INSOMNIA: ICD-10-CM

## 2017-02-22 PROCEDURE — 665999 HH PPS REVENUE DEBIT

## 2017-02-22 PROCEDURE — 665998 HH PPS REVENUE CREDIT

## 2017-02-22 RX ORDER — ZOLPIDEM TARTRATE 5 MG/1
5 TABLET ORAL NIGHTLY PRN
Qty: 30 TAB | Refills: 0 | Status: SHIPPED
Start: 2017-02-22 | End: 2017-03-27 | Stop reason: SDUPTHER

## 2017-02-22 RX ORDER — LEVOTHYROXINE SODIUM 0.15 MG/1
TABLET ORAL
COMMUNITY
Start: 2017-01-24 | End: 2017-02-22

## 2017-02-22 RX ORDER — LEVOTHYROXINE SODIUM 0.1 MG/1
100 TABLET ORAL
Qty: 90 TAB | Refills: 2 | Status: SHIPPED
Start: 2017-02-22 | End: 2017-05-25 | Stop reason: SDUPTHER

## 2017-02-22 RX ORDER — HYDRALAZINE HYDROCHLORIDE 25 MG/1
25 TABLET, FILM COATED ORAL EVERY 8 HOURS
Qty: 270 TAB | Refills: 2 | Status: SHIPPED
Start: 2017-02-22 | End: 2017-03-01

## 2017-02-22 RX ORDER — BETHANECHOL CHLORIDE 25 MG/1
25 TABLET ORAL 3 TIMES DAILY
Qty: 270 TAB | Refills: 2 | Status: SHIPPED
Start: 2017-02-22 | End: 2017-05-25 | Stop reason: SDUPTHER

## 2017-02-22 RX ORDER — GABAPENTIN 600 MG/1
1200 TABLET ORAL 3 TIMES DAILY
Qty: 90 TAB | Refills: 0 | Status: CANCELLED | OUTPATIENT
Start: 2017-02-22

## 2017-02-22 RX ORDER — HYDROCODONE BITARTRATE AND ACETAMINOPHEN 10; 325 MG/1; MG/1
1-2 TABLET ORAL EVERY 6 HOURS PRN
Qty: 20 TAB | Refills: 0 | Status: CANCELLED | OUTPATIENT
Start: 2017-02-22

## 2017-02-22 RX ORDER — TERAZOSIN 10 MG/1
10 CAPSULE ORAL DAILY
Qty: 90 CAP | Refills: 3 | Status: SHIPPED
Start: 2017-02-22 | End: 2017-05-25 | Stop reason: SDUPTHER

## 2017-02-23 PROCEDURE — 665999 HH PPS REVENUE DEBIT

## 2017-02-23 PROCEDURE — 665998 HH PPS REVENUE CREDIT

## 2017-02-24 PROCEDURE — 665998 HH PPS REVENUE CREDIT

## 2017-02-24 PROCEDURE — 665999 HH PPS REVENUE DEBIT

## 2017-02-25 PROCEDURE — 665998 HH PPS REVENUE CREDIT

## 2017-02-25 PROCEDURE — 665999 HH PPS REVENUE DEBIT

## 2017-02-26 PROCEDURE — 665999 HH PPS REVENUE DEBIT

## 2017-02-26 PROCEDURE — 665998 HH PPS REVENUE CREDIT

## 2017-02-27 PROCEDURE — 665998 HH PPS REVENUE CREDIT

## 2017-02-27 PROCEDURE — 665999 HH PPS REVENUE DEBIT

## 2017-02-28 PROCEDURE — 665999 HH PPS REVENUE DEBIT

## 2017-02-28 PROCEDURE — 665998 HH PPS REVENUE CREDIT

## 2017-03-01 ENCOUNTER — OFFICE VISIT (OUTPATIENT)
Dept: MEDICAL GROUP | Facility: PHYSICIAN GROUP | Age: 62
End: 2017-03-01
Payer: MEDICARE

## 2017-03-01 VITALS
BODY MASS INDEX: 28.14 KG/M2 | WEIGHT: 190 LBS | TEMPERATURE: 97 F | HEIGHT: 69 IN | OXYGEN SATURATION: 97 % | SYSTOLIC BLOOD PRESSURE: 138 MMHG | DIASTOLIC BLOOD PRESSURE: 70 MMHG | HEART RATE: 60 BPM

## 2017-03-01 DIAGNOSIS — R29.6 FREQUENT FALLS: ICD-10-CM

## 2017-03-01 DIAGNOSIS — K92.1 BLOOD IN STOOL: ICD-10-CM

## 2017-03-01 DIAGNOSIS — F51.02 ADJUSTMENT INSOMNIA: ICD-10-CM

## 2017-03-01 DIAGNOSIS — F11.20 OPIOID TYPE DEPENDENCE, CONTINUOUS (HCC): ICD-10-CM

## 2017-03-01 DIAGNOSIS — G82.50 QUADRIPLEGIA, POST-TRAUMATIC (HCC): ICD-10-CM

## 2017-03-01 DIAGNOSIS — S81.801D LEG WOUND, RIGHT, SUBSEQUENT ENCOUNTER: ICD-10-CM

## 2017-03-01 DIAGNOSIS — S14.109S QUADRIPLEGIA, POST-TRAUMATIC (HCC): ICD-10-CM

## 2017-03-01 DIAGNOSIS — E03.4 HYPOTHYROIDISM DUE TO ACQUIRED ATROPHY OF THYROID: ICD-10-CM

## 2017-03-01 PROBLEM — S81.801A LEG WOUND, RIGHT: Status: ACTIVE | Noted: 2017-01-02

## 2017-03-01 PROBLEM — M62.82 RHABDOMYOLYSIS: Status: RESOLVED | Noted: 2017-01-01 | Resolved: 2017-03-01

## 2017-03-01 PROBLEM — I95.9 HYPOTENSION: Status: RESOLVED | Noted: 2017-01-02 | Resolved: 2017-03-01

## 2017-03-01 PROCEDURE — G8419 CALC BMI OUT NRM PARAM NOF/U: HCPCS | Performed by: NURSE PRACTITIONER

## 2017-03-01 PROCEDURE — 665998 HH PPS REVENUE CREDIT

## 2017-03-01 PROCEDURE — G8484 FLU IMMUNIZE NO ADMIN: HCPCS | Performed by: NURSE PRACTITIONER

## 2017-03-01 PROCEDURE — 665999 HH PPS REVENUE DEBIT

## 2017-03-01 PROCEDURE — 99214 OFFICE O/P EST MOD 30 MIN: CPT | Performed by: NURSE PRACTITIONER

## 2017-03-01 PROCEDURE — 3017F COLORECTAL CA SCREEN DOC REV: CPT | Performed by: NURSE PRACTITIONER

## 2017-03-01 PROCEDURE — G8432 DEP SCR NOT DOC, RNG: HCPCS | Performed by: NURSE PRACTITIONER

## 2017-03-01 PROCEDURE — 1036F TOBACCO NON-USER: CPT | Performed by: NURSE PRACTITIONER

## 2017-03-01 NOTE — ASSESSMENT & PLAN NOTE
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 Continues to have contractures of hands with ability to stand and move arms. He has caregivers at home to assist him throughout most of the day. He is currently in physical therapy to assist in his ADLs and strengthening

## 2017-03-01 NOTE — ASSESSMENT & PLAN NOTE
He has occasionally taken 2.5 mg of Ambien every bedtime as needed for sleep which works well for him

## 2017-03-01 NOTE — Clinical Note
March 1, 2017        Jesus Pina  [address] [address]        Dear Jesus:    Please discontinue hydralazine 25 mg tablets. You can throw these away.    We have requested records from [facility] to find your colonoscopy results.     Please continue to drink electrolyte filled fluids plus water.     If you have any questions or concerns, please don't hesitate to call.        Sincerely,        JODIE Dewey.    Electronically Signed

## 2017-03-01 NOTE — ASSESSMENT & PLAN NOTE
Established problem managed with levothyroxine 100 mcg daily   Ref. Range 6/20/2016 10:19 1/1/2017 17:10   TSH Latest Ref Range: 0.300-3.700 uIU/mL 1.230 0.490

## 2017-03-01 NOTE — ASSESSMENT & PLAN NOTE
Has persistent wound on right hip since he had a fall from his wheelchair. Home health was previously dressing wound, but they are no longer seeing him. Patient reports he still has met a honey at home but did not think he had to care for it any longer.

## 2017-03-01 NOTE — ASSESSMENT & PLAN NOTE
Patient has not been home and present for home PT/OT. States he just started outpatient physical therapy. He has appropriate assistive devices at home.

## 2017-03-01 NOTE — MR AVS SNAPSHOT
"        Jeuss Pina   3/1/2017 11:00 AM   Office Visit   MRN: [id number]    Department:  [facility]   Dept Phone:  [phone]    Description:  Male : 1955   Provider:  AALIYAH Dewey           Reason for Visit     Follow-Up           Allergies as of 3/1/2017     Allergen Noted Reactions    Erythromycin 2016   Unspecified    \"It chokes me\"    Pcn [Penicillins] 2016   Rash    Develops a rash with some PCN derivatives.     Tdap [Dipth, Acell Pertus, Tetanus] 06/15/2016   Swelling    Neck Swelling      You were diagnosed with     Blood in stool   [578.1.ICD-9-CM]       Hypothyroidism due to acquired atrophy of thyroid   [5276558]       Decubital ulcer   [243695]       Frequent falls   [606600]       Adjustment insomnia   [205630]         Vital Signs     Blood Pressure Pulse Temperature Height Weight Body Mass Index    138/70 mmHg 60 36.1 °C (97 °F) 1.765 m (5' 9.49\") 86.183 kg (190 lb) 27.67 kg/m2    Oxygen Saturation Smoking Status                97% Never Smoker           Basic Information     Date Of Birth Sex Race Ethnicity Preferred Language    1955 Male White Non- English      Your appointments     Sep 05, 2017  1:00 PM   Established Patient with AALIYAH Dewey   [facility]    [address] [place] [id number] [phone]           You will be receiving a confirmation call a few days before your appointment from our automated call confirmation system.              Problem List              ICD-10-CM Priority Class Noted - Resolved    Lower urinary tract symptoms R39.9   6/15/2016 - Present    Quadriplegia, post-traumatic (CMS-HCC) G82.50   6/15/2016 - Present    Blood in stool K92.1   6/15/2016 - Present    Chronic pain G89.29   6/15/2016 - Present    Hypothyroidism due to acquired atrophy of thyroid E03.4   6/15/2016 - Present    Opioid type dependence, continuous (CMS-HCC) F11.20   9/15/2016 - Present    Vitamin D insufficiency " E55.9   9/15/2016 - Present    Frequent falls R29.6   9/20/2016 - Present    Decubital ulcer L89.90   1/2/2017 - Present    Adjustment insomnia F51.02   3/1/2017 - Present      Health Maintenance        Date Due Completion Dates    COLONOSCOPY 1955 ---    IMM ZOSTER VACCINE 11/22/2014 ---    IMM INFLUENZA (1) 9/1/2017 (Originally 9/1/2016) ---            Current Immunizations     No immunizations on file.      Below and/or attached are the medications your provider expects you to take. Review all of your home medications and newly ordered medications with your provider and/or pharmacist. Follow medication instructions as directed by your provider and/or pharmacist. Please keep your medication list with you and share with your provider. Update the information when medications are discontinued, doses are changed, or new medications (including over-the-counter products) are added; and carry medication information at all times in the event of emergency situations     Allergies:  ERYTHROMYCIN - Unspecified     PCN - Rash     TDAP - Swelling               Medications  Valid as of: March 01, 2017 - 12:05 PM    Generic Name Brand Name Tablet Size Instructions for use    Arginine (Tab) L-Arginine 1000 MG Take 1 Cap by mouth every day.        Ascorbic Acid (Tab) ascorbic acid 500 MG Take 500 mg by mouth every day.        Baclofen (Tab) LIORESAL 10 MG Take 20 mg by mouth 3 times a day.        Bethanechol Chloride (Tab) URECHOLINE 25 MG Take 1 Tab by mouth 3 times a day.        Docusate Sodium (Cap) COLACE 100 MG Take 100 mg by mouth 2 times a day.        Fish Oil-Krill Oil   Take 1 Cap by mouth every day at 6 PM.        Gabapentin (Tab) NEURONTIN 600 MG Take 1,200 mg by mouth 3 times a day.        Hydrocodone-Acetaminophen (Tab) NORCO  MG Take 1-2 Tabs by mouth every 6 hours as needed for Severe Pain.        Levothyroxine Sodium (Tab) SYNTHROID 100 MCG Take 1 Tab by mouth Every morning on an empty stomach.         Milk Thistle (Cap) Milk Thistle 175 MG Take 1 Cap by mouth every day.        Multiple Vitamins-Minerals   Take 1 Tab by mouth every day.        Nettle-Pygeum africanum (Cap) Nettle-Pygeum africanum 300-25 MG Take 1 Cap by mouth every day.        Pregabalin (Cap) LYRICA 50 MG Take 50 mg by mouth 3 times a day.        Terazosin HCl (Cap) HYTRIN 10 MG Take 1 Cap by mouth every day.        Zolpidem Tartrate (Tab) AMBIEN 5 MG Take 1 Tab by mouth at bedtime as needed for Sleep.        .                 Medicines prescribed today were sent to:     [facility] - [place], [place] - [address]    [address] [address]    Phone: [phone] Fax: [phone]    Open 24 Hours?: No      Medication refill instructions:       If your prescription bottle indicates you have medication refills left, it is not necessary to call your provider’s office. Please contact your pharmacy and they will refill your medication.    If your prescription bottle indicates you do not have any refills left, you may request refills at any time through one of the following ways: The online [employer] system (except Urgent Care), by calling your provider’s office, or by asking your pharmacy to contact your provider’s office with a refill request. Medication refills are processed only during regular business hours and may not be available until the next business day. Your provider may request additional information or to have a follow-up visit with you prior to refilling your medication.   *Please Note: Medication refills are assigned a new Rx number when refilled electronically. Your pharmacy may indicate that no refills were authorized even though a new prescription for the same medication is available at the pharmacy. Please request the medicine by name with the pharmacy before contacting your provider for a refill.           MyChart Status: Patient Declined

## 2017-03-01 NOTE — PROGRESS NOTES
Subjective:     Chief Complaint   Patient presents with   • Follow-Up       HPI  Jesus Pina is a 61 y.o. male here today for routine follow-up. The following conditions were discussed:    Blood in stool  Denies any further blood in stool since 2016. We have requested records from [facility] to review colonoscopy, but did not receive results. Patient states he had polyps but was told everything was okay otherwise.     Hypothyroidism due to acquired atrophy of thyroid  Established problem managed with levothyroxine 100 mcg daily   Ref. Range 6/20/2016 10:19 1/1/2017 17:10   TSH Latest Ref Range: 0.300-3.700 uIU/mL 1.230 0.490       Leg wound, right  Has persistent wound on right hip since he had a fall from his wheelchair. Home health was previously dressing wound, but they are no longer seeing him. Patient reports he still has met a honey at home but did not think he had to care for it any longer.    Frequent falls  Patient has not been home and present for home PT/OT. States he just started outpatient physical therapy. He has appropriate assistive devices at home.    Adjustment insomnia  He has occasionally taken 2.5 mg of Ambien every bedtime as needed for sleep which works well for him    Quadriplegia, post-traumatic (CMS-HCC)  Chronic problem continues to be high functioning quadriplegic. Continues to have contractures of hands with ability to stand and move arms. He has caregivers at home to assist him throughout most of the day. He is currently in physical therapy to assist in his ADLs and strengthening    Opioid type dependence, continuous (CMS-HCC)  Continues to follow Dr. Pickard for chronic pain management         Diagnoses of Quadriplegia, post-traumatic (CMS-HCC), Frequent falls, Blood in stool, Hypothyroidism due to acquired atrophy of thyroid, Leg wound, right, subsequent encounter, Adjustment insomnia, and Opioid type dependence, continuous (CMS-[facility]) were pertinent to this  visit.    Allergies: Erythromycin; Pcn; and Tdap  Current medicines (including changes today)  Current Outpatient Prescriptions   Medication Sig Dispense Refill   • zolpidem (AMBIEN) 5 MG Tab Take 1 Tab by mouth at bedtime as needed for Sleep. 30 Tab 0   • bethanechol (URECHOLINE) 25 MG Tab Take 1 Tab by mouth 3 times a day. 270 Tab 2   • levothyroxine (SYNTHROID) 100 MCG Tab Take 1 Tab by mouth Every morning on an empty stomach. 90 Tab 2   • terazosin (HYTRIN) 10 MG capsule Take 1 Cap by mouth every day. 90 Cap 3   • pregabalin (LYRICA) 50 MG capsule Take 50 mg by mouth 3 times a day.     • hydrocodone/acetaminophen (NORCO)  MG Tab Take 1-2 Tabs by mouth every 6 hours as needed for Severe Pain. 20 Tab 0   • gabapentin (NEURONTIN) 600 MG tablet Take 1,200 mg by mouth 3 times a day.     • docusate sodium (STOOL SOFTENER) 100 MG Cap Take 100 mg by mouth 2 times a day.     • Multiple Vitamins-Minerals (CENTRUM SILVER ADULT 50+ PO) Take 1 Tab by mouth every day.     • Milk Thistle (EQL MILK THISTLE) 175 MG Cap Take 1 Cap by mouth every day.     • ascorbic acid (ASCORBIC ACID) 500 MG Tab Take 500 mg by mouth every day.     • L-Arginine 1000 MG Tab Take 1 Cap by mouth every day.     • [place] africanum 300-25 MG Cap Take 1 Cap by mouth every day.     • FISH OIL-KRILL OIL PO Take 1 Cap by mouth every day at 6 PM.     • baclofen (LIORESAL) 10 MG Tab Take 20 mg by mouth 3 times a day.       No current facility-administered medications for this visit.       He  has a past medical history of Thyroid disease; BPH (benign prostatic hyperplasia); Neck fracture (CMS-HCC); Heart attack (CMS-HCC); and Muscle disorder.      ROS  As stated in HPI and additionally  Constitutional: No F/C, night sweats, fatigue, weight loss  Head/Neck: No headache, dizziness, or head injurya  Lungs: No cough, sob, dyspne  Cardiac: No chest pain, palpitations, syncope  Skin: +wound right hip/leg  GI: No pain, n/v, heartburn, blood in stool,  "constipation or diarrhea       Objective:     Blood pressure 138/70, pulse 60, temperature 36.1 °C (97 °F), height 1.765 m (5' 9.49\"), weight 86.183 kg (190 lb), SpO2 97 %. Body mass index is 27.67 kg/(m^2).  Physical Exam:  General: Alert, oriented, in no acute distress.  Eye contact is good, speech goal directed, affect calm and pleasant   HEENT: EOMI, conjunctiva non-injected, sclera non-icteric.  Oral mucous membranes pink and moist with no lesions.Oropharynx without erythema or exudate  Lungs: unlabored, clear to auscultation bilaterally. Lung sounds diminished in low lobes  CV: regular rate and rhythm, no murmurs, no carotid bruits   Lower extremities color normal, vascularity normal, no edema, temperature normal  Neuro: Gait unsteady. Able to move all extremities. Hands and fingers with contractures, but able to slightly open fingers. Strength 3/5 all extremities.     Skin: hands with peeling, dry skin of palms. Right hip/RLE with 2 inch long healing wound with surrounding erythema     Assessment and Plan:   The following treatment plan was discussed  1. Quadriplegia, post-traumatic (CMS-[facility])  Stable    2. Frequent falls  Patient is in PT at this time. Seems to be somewhat noncompliant with treatments though, possibly r/t ride situation from sounds of it. Has appropriate assistive devices and in home caregivers.    3. Blood in stool  Stable without further bleeding. Obtain records from [facility] to get colonoscopy report   4. Hypothyroidism due to acquired atrophy of thyroid  Controlled on current dose TSH due January 2018   5. Leg wound, right, subsequent encounter  Recommended OTC Bacitracin to wound daily with medihoney and non-adhesive dressing with paper tape (supplies given except medihoney he has a mario albetro)   6. Adjustment insomnia  Controlled with occasional use of Ambien 2.5 mg   7. Opioid type dependence, continuous (CMS-[facility])  Continue f/u with Dr. Pickard        Followup: Return in about 6 months (around " 9/1/2017) for Long. sooner should new symptoms or problems arise.

## 2017-03-01 NOTE — ASSESSMENT & PLAN NOTE
Continues to follow Dr. Pickard for chronic pain management   If you have any question or concerns about today's appointment, the verbal and/or written instructions you were given for follow up care, please call our office at [phone].      [facility], [address]  [place], [address]

## 2017-03-01 NOTE — ASSESSMENT & PLAN NOTE
Denies any further blood in stool since 2016. We have requested records from [facility] to review colonoscopy, but did not receive results. Patient states he had polyps but was told everything was okay otherwise.

## 2017-03-02 PROCEDURE — 665999 HH PPS REVENUE DEBIT

## 2017-03-02 PROCEDURE — 665998 HH PPS REVENUE CREDIT

## 2017-03-03 PROCEDURE — 665998 HH PPS REVENUE CREDIT

## 2017-03-03 PROCEDURE — 665999 HH PPS REVENUE DEBIT

## 2017-03-04 PROCEDURE — 665999 HH PPS REVENUE DEBIT

## 2017-03-04 PROCEDURE — 665998 HH PPS REVENUE CREDIT

## 2017-03-05 PROCEDURE — 665999 HH PPS REVENUE DEBIT

## 2017-03-05 PROCEDURE — 665998 HH PPS REVENUE CREDIT

## 2017-03-07 ENCOUNTER — TELEPHONE (OUTPATIENT)
Dept: MEDICAL GROUP | Facility: PHYSICIAN GROUP | Age: 62
End: 2017-03-07

## 2017-03-08 NOTE — TELEPHONE ENCOUNTER
Patient's caregiver informed me that wound has improved and there is no more redness on right hip    JODIE Dewey.

## 2017-03-08 NOTE — TELEPHONE ENCOUNTER
----- Message from AALIYAH Dewey sent at 3/1/2017  3:23 PM PST -----  Stop hydralazine and check on wound

## 2017-03-24 ENCOUNTER — TELEPHONE (OUTPATIENT)
Dept: MEDICAL GROUP | Facility: PHYSICIAN GROUP | Age: 62
End: 2017-03-24

## 2017-03-24 RX ORDER — BACLOFEN 10 MG/1
20 TABLET ORAL 3 TIMES DAILY
Qty: 90 TAB | Refills: 3 | Status: SHIPPED | OUTPATIENT
Start: 2017-03-24 | End: 2017-05-25 | Stop reason: SDUPTHER

## 2017-03-24 RX ORDER — HYDRALAZINE HYDROCHLORIDE 25 MG/1
TABLET, FILM COATED ORAL
COMMUNITY
Start: 2017-02-24 | End: 2017-05-25 | Stop reason: SDUPTHER

## 2017-03-27 DIAGNOSIS — F51.02 ADJUSTMENT INSOMNIA: ICD-10-CM

## 2017-03-28 RX ORDER — ZOLPIDEM TARTRATE 5 MG/1
5 TABLET ORAL NIGHTLY PRN
Qty: 30 TAB | Refills: 0 | Status: SHIPPED
Start: 2017-03-28 | End: 2017-09-13

## 2017-05-04 RX ORDER — GABAPENTIN 600 MG/1
1200 TABLET ORAL 3 TIMES DAILY
Qty: 180 TAB | Refills: 3 | Status: SHIPPED | OUTPATIENT
Start: 2017-05-04 | End: 2017-05-25 | Stop reason: SDUPTHER

## 2017-05-25 RX ORDER — TERAZOSIN 10 MG/1
10 CAPSULE ORAL DAILY
Qty: 90 CAP | Refills: 3 | Status: SHIPPED | OUTPATIENT
Start: 2017-05-25 | End: 2017-07-10 | Stop reason: SDUPTHER

## 2017-05-25 RX ORDER — BACLOFEN 10 MG/1
20 TABLET ORAL 3 TIMES DAILY
Qty: 90 TAB | Refills: 3 | Status: SHIPPED | OUTPATIENT
Start: 2017-05-25 | End: 2017-07-10

## 2017-05-25 RX ORDER — BETHANECHOL CHLORIDE 25 MG/1
25 TABLET ORAL 3 TIMES DAILY
Qty: 270 TAB | Refills: 2 | Status: SHIPPED | OUTPATIENT
Start: 2017-05-25 | End: 2017-07-10 | Stop reason: SDUPTHER

## 2017-05-25 RX ORDER — GABAPENTIN 600 MG/1
1200 TABLET ORAL 3 TIMES DAILY
Qty: 180 TAB | Refills: 3 | Status: SHIPPED | OUTPATIENT
Start: 2017-05-25 | End: 2017-07-10 | Stop reason: SDUPTHER

## 2017-05-25 RX ORDER — LEVOTHYROXINE SODIUM 0.1 MG/1
100 TABLET ORAL
Qty: 90 TAB | Refills: 2 | Status: SHIPPED | OUTPATIENT
Start: 2017-05-25 | End: 2017-07-10 | Stop reason: SDUPTHER

## 2017-05-25 RX ORDER — HYDRALAZINE HYDROCHLORIDE 25 MG/1
25 TABLET, FILM COATED ORAL 3 TIMES DAILY
Qty: 90 TAB | Refills: 2 | Status: SHIPPED | OUTPATIENT
Start: 2017-05-25 | End: 2017-07-10 | Stop reason: SDUPTHER

## 2017-07-10 RX ORDER — BETHANECHOL CHLORIDE 25 MG/1
25 TABLET ORAL 3 TIMES DAILY
Qty: 270 TAB | Refills: 2 | Status: SHIPPED | OUTPATIENT
Start: 2017-07-10 | End: 2018-04-25 | Stop reason: SDUPTHER

## 2017-07-10 RX ORDER — GABAPENTIN 600 MG/1
1200 TABLET ORAL 3 TIMES DAILY
Qty: 180 TAB | Refills: 2 | Status: SHIPPED | OUTPATIENT
Start: 2017-07-10 | End: 2017-09-13

## 2017-07-10 RX ORDER — BACLOFEN 20 MG/1
20 TABLET ORAL 3 TIMES DAILY
Qty: 270 TAB | Refills: 2 | Status: SHIPPED | OUTPATIENT
Start: 2017-07-10

## 2017-07-10 RX ORDER — BACLOFEN 10 MG/1
20 TABLET ORAL 3 TIMES DAILY
Qty: 90 TAB | Refills: 0 | OUTPATIENT
Start: 2017-07-10

## 2017-07-10 RX ORDER — TERAZOSIN 10 MG/1
10 CAPSULE ORAL DAILY
Qty: 90 CAP | Refills: 2 | Status: SHIPPED | OUTPATIENT
Start: 2017-07-10 | End: 2017-09-29 | Stop reason: SDUPTHER

## 2017-07-10 RX ORDER — HYDRALAZINE HYDROCHLORIDE 25 MG/1
25 TABLET, FILM COATED ORAL 3 TIMES DAILY
Qty: 90 TAB | Refills: 2 | Status: SHIPPED | OUTPATIENT
Start: 2017-07-10 | End: 2017-09-13

## 2017-07-10 RX ORDER — LEVOTHYROXINE SODIUM 0.1 MG/1
100 TABLET ORAL
Qty: 90 TAB | Refills: 2 | Status: SHIPPED | OUTPATIENT
Start: 2017-07-10 | End: 2017-09-15

## 2017-09-13 ENCOUNTER — OFFICE VISIT (OUTPATIENT)
Dept: MEDICAL GROUP | Facility: PHYSICIAN GROUP | Age: 62
End: 2017-09-13
Payer: MEDICARE

## 2017-09-13 VITALS
BODY MASS INDEX: 28.88 KG/M2 | DIASTOLIC BLOOD PRESSURE: 68 MMHG | HEIGHT: 69 IN | TEMPERATURE: 98.1 F | SYSTOLIC BLOOD PRESSURE: 102 MMHG | RESPIRATION RATE: 12 BRPM | HEART RATE: 66 BPM | WEIGHT: 195 LBS | OXYGEN SATURATION: 90 %

## 2017-09-13 DIAGNOSIS — E55.9 VITAMIN D INSUFFICIENCY: ICD-10-CM

## 2017-09-13 DIAGNOSIS — R53.82 CHRONIC FATIGUE: ICD-10-CM

## 2017-09-13 DIAGNOSIS — F32.9 REACTIVE DEPRESSION: ICD-10-CM

## 2017-09-13 DIAGNOSIS — E03.4 HYPOTHYROIDISM DUE TO ACQUIRED ATROPHY OF THYROID: ICD-10-CM

## 2017-09-13 DIAGNOSIS — G89.4 CHRONIC PAIN SYNDROME: ICD-10-CM

## 2017-09-13 DIAGNOSIS — G47.01 INSOMNIA DUE TO MEDICAL CONDITION: ICD-10-CM

## 2017-09-13 PROCEDURE — 99214 OFFICE O/P EST MOD 30 MIN: CPT | Performed by: NURSE PRACTITIONER

## 2017-09-13 RX ORDER — LACTULOSE 10 G/15ML
SOLUTION ORAL
COMMUNITY
Start: 2017-08-30 | End: 2018-01-07

## 2017-09-13 RX ORDER — BACLOFEN 10 MG/1
TABLET ORAL
COMMUNITY
Start: 2017-06-19 | End: 2018-01-07

## 2017-09-13 RX ORDER — GABAPENTIN 800 MG/1
TABLET ORAL
COMMUNITY
Start: 2017-08-22 | End: 2018-01-07

## 2017-09-13 RX ORDER — SULFAMETHOXAZOLE AND TRIMETHOPRIM 800; 160 MG/1; MG/1
TABLET ORAL
COMMUNITY
Start: 2017-08-23 | End: 2017-09-13

## 2017-09-13 RX ORDER — CEPHALEXIN 500 MG/1
CAPSULE ORAL
COMMUNITY
Start: 2017-08-23 | End: 2017-09-13

## 2017-09-13 ASSESSMENT — PATIENT HEALTH QUESTIONNAIRE - PHQ9
CLINICAL INTERPRETATION OF PHQ2 SCORE: 2
SUM OF ALL RESPONSES TO PHQ QUESTIONS 1-9: 12
5. POOR APPETITE OR OVEREATING: 2 - MORE THAN HALF THE DAYS

## 2017-09-13 NOTE — ASSESSMENT & PLAN NOTE
Insomnia comes and goes. Often times the pain keeps him awake. Cannot use Ambien d/t chronic narcotic use.

## 2017-09-13 NOTE — ASSESSMENT & PLAN NOTE
Long-standing problem managed with levothyroxine 100 mcg daily on empty stomach, reports 100% compliance with this. Last TSH normal January this year.

## 2017-09-13 NOTE — ASSESSMENT & PLAN NOTE
Chronic problem followed by pain management, currently treated with Lyrica, baclofen, gabapentin, and Norco. Pain occurs daily, throughout his body, everyday. He finds he is having a hard time focusing for the past few months. This is associated with increased fatigue. Depression is coming and going as he does not feel like he has functioning to his full potential. States that he is aware of his current state with no chance for improvement, but he feels like the pain is really starting to get to him emotionally

## 2017-09-14 ENCOUNTER — HOSPITAL ENCOUNTER (OUTPATIENT)
Dept: LAB | Facility: MEDICAL CENTER | Age: 62
End: 2017-09-14
Attending: NURSE PRACTITIONER
Payer: MEDICARE

## 2017-09-14 DIAGNOSIS — F32.9 REACTIVE DEPRESSION: ICD-10-CM

## 2017-09-14 DIAGNOSIS — R53.82 CHRONIC FATIGUE: ICD-10-CM

## 2017-09-14 DIAGNOSIS — E55.9 VITAMIN D INSUFFICIENCY: ICD-10-CM

## 2017-09-14 DIAGNOSIS — E03.4 HYPOTHYROIDISM DUE TO ACQUIRED ATROPHY OF THYROID: ICD-10-CM

## 2017-09-14 LAB
25(OH)D3 SERPL-MCNC: 14 NG/ML (ref 30–100)
ALBUMIN SERPL BCP-MCNC: 4 G/DL (ref 3.2–4.9)
ALBUMIN/GLOB SERPL: 1.2 G/DL
ALP SERPL-CCNC: 62 U/L (ref 30–99)
ALT SERPL-CCNC: 15 U/L (ref 2–50)
ANION GAP SERPL CALC-SCNC: 11 MMOL/L (ref 0–11.9)
AST SERPL-CCNC: 20 U/L (ref 12–45)
BASOPHILS # BLD AUTO: 0.6 % (ref 0–1.8)
BASOPHILS # BLD: 0.03 K/UL (ref 0–0.12)
BILIRUB SERPL-MCNC: 0.4 MG/DL (ref 0.1–1.5)
BUN SERPL-MCNC: 19 MG/DL (ref 8–22)
CALCIUM SERPL-MCNC: 9.2 MG/DL (ref 8.5–10.5)
CHLORIDE SERPL-SCNC: 105 MMOL/L (ref 96–112)
CO2 SERPL-SCNC: 23 MMOL/L (ref 20–33)
CREAT SERPL-MCNC: 0.9 MG/DL (ref 0.5–1.4)
EOSINOPHIL # BLD AUTO: 0.06 K/UL (ref 0–0.51)
EOSINOPHIL NFR BLD: 1.2 % (ref 0–6.9)
ERYTHROCYTE [DISTWIDTH] IN BLOOD BY AUTOMATED COUNT: 46.6 FL (ref 35.9–50)
GFR SERPL CREATININE-BSD FRML MDRD: >60 ML/MIN/1.73 M 2
GLOBULIN SER CALC-MCNC: 3.3 G/DL (ref 1.9–3.5)
GLUCOSE SERPL-MCNC: 81 MG/DL (ref 65–99)
HCT VFR BLD AUTO: 39 % (ref 42–52)
HGB BLD-MCNC: 13.1 G/DL (ref 14–18)
IMM GRANULOCYTES # BLD AUTO: 0.01 K/UL (ref 0–0.11)
IMM GRANULOCYTES NFR BLD AUTO: 0.2 % (ref 0–0.9)
LYMPHOCYTES # BLD AUTO: 1.23 K/UL (ref 1–4.8)
LYMPHOCYTES NFR BLD: 23.9 % (ref 22–41)
MCH RBC QN AUTO: 31.2 PG (ref 27–33)
MCHC RBC AUTO-ENTMCNC: 33.6 G/DL (ref 33.7–35.3)
MCV RBC AUTO: 92.9 FL (ref 81.4–97.8)
MONOCYTES # BLD AUTO: 0.46 K/UL (ref 0–0.85)
MONOCYTES NFR BLD AUTO: 8.9 % (ref 0–13.4)
NEUTROPHILS # BLD AUTO: 3.35 K/UL (ref 1.82–7.42)
NEUTROPHILS NFR BLD: 65.2 % (ref 44–72)
NRBC # BLD AUTO: 0 K/UL
NRBC BLD AUTO-RTO: 0 /100 WBC
PLATELET # BLD AUTO: 203 K/UL (ref 164–446)
PMV BLD AUTO: 9.5 FL (ref 9–12.9)
POTASSIUM SERPL-SCNC: 4.3 MMOL/L (ref 3.6–5.5)
PROT SERPL-MCNC: 7.3 G/DL (ref 6–8.2)
RBC # BLD AUTO: 4.2 M/UL (ref 4.7–6.1)
SODIUM SERPL-SCNC: 139 MMOL/L (ref 135–145)
TSH SERPL DL<=0.005 MIU/L-ACNC: 10.3 UIU/ML (ref 0.3–3.7)
VIT B12 SERPL-MCNC: 455 PG/ML (ref 211–911)
WBC # BLD AUTO: 5.1 K/UL (ref 4.8–10.8)

## 2017-09-14 PROCEDURE — 82607 VITAMIN B-12: CPT

## 2017-09-14 PROCEDURE — 85025 COMPLETE CBC W/AUTO DIFF WBC: CPT

## 2017-09-14 PROCEDURE — 36415 COLL VENOUS BLD VENIPUNCTURE: CPT

## 2017-09-14 PROCEDURE — 84443 ASSAY THYROID STIM HORMONE: CPT

## 2017-09-14 PROCEDURE — 80053 COMPREHEN METABOLIC PANEL: CPT

## 2017-09-14 PROCEDURE — 82306 VITAMIN D 25 HYDROXY: CPT

## 2017-09-15 ENCOUNTER — TELEPHONE (OUTPATIENT)
Dept: MEDICAL GROUP | Facility: PHYSICIAN GROUP | Age: 62
End: 2017-09-15

## 2017-09-15 DIAGNOSIS — E03.4 HYPOTHYROIDISM DUE TO ACQUIRED ATROPHY OF THYROID: ICD-10-CM

## 2017-09-15 DIAGNOSIS — E55.9 VITAMIN D DEFICIENCY: ICD-10-CM

## 2017-09-15 RX ORDER — ERGOCALCIFEROL 1.25 MG/1
50000 CAPSULE ORAL
Qty: 12 CAP | Refills: 0 | Status: SHIPPED | OUTPATIENT
Start: 2017-09-15 | End: 2017-09-29 | Stop reason: SDUPTHER

## 2017-09-15 RX ORDER — LEVOTHYROXINE SODIUM 0.12 MG/1
125 TABLET ORAL
Qty: 90 TAB | Refills: 0 | Status: SHIPPED | OUTPATIENT
Start: 2017-09-15 | End: 2018-03-13 | Stop reason: SDUPTHER

## 2017-09-16 NOTE — PROGRESS NOTES
Subjective:     Chief Complaint   Patient presents with   • Follow-Up     6 months, depression, unable to focus, fatigue, chronic pain       HPI  Jesus Pina is a 61 y.o. male here today for ongoing depression, poor concentration, chronic fatigue, and pain.    Chronic pain  Chronic problem followed by pain management, currently treated with Lyrica, baclofen, gabapentin, and Norco. Pain occurs daily, throughout his body, everyday. He finds he is having a hard time focusing for the past few months. This is associated with increased fatigue. Depression is coming and going as he does not feel like he has functioning to his full potential. States that he is aware of his current state with no chance for improvement, but he feels like the pain is really starting to get to him emotionally    Hypothyroidism due to acquired atrophy of thyroid  Long-standing problem managed with levothyroxine 100 mcg daily on empty stomach, reports 100% compliance with this. Last TSH normal January this year.     Insomnia due to medical condition  Insomnia comes and goes. Often times the pain keeps him awake. Cannot use Ambien d/t chronic narcotic use.     Vitamin D deficiency  Has history of vitamin D deficiency last level checked June 2016 low at 29. Not currently on supplementation       Diagnoses of Hypothyroidism due to acquired atrophy of thyroid, Chronic pain syndrome, Reactive depression, Vitamin D insufficiency, Insomnia due to medical condition, and Chronic fatigue were pertinent to this visit.    Allergies: Erythromycin; Pcn [penicillins]; and Tdap [dipth, acell pertus, tetanus]  Current medicines (including changes today)  Current Outpatient Prescriptions   Medication Sig Dispense Refill   • baclofen (LIORESAL) 10 MG Tab      • lactulose 10 GM/15ML Solution      • gabapentin (NEURONTIN) 800 MG tablet      • terazosin (HYTRIN) 10 MG capsule Take 1 Cap by mouth every day. 90 Cap 2   • bethanechol (URECHOLINE) 25 MG Tab Take 1 Tab by  "mouth 3 times a day. 270 Tab 2   • baclofen (LIORESAL) 20 MG tablet Take 1 Tab by mouth 3 times a day. 270 Tab 2   • pregabalin (LYRICA) 50 MG capsule Take 50 mg by mouth 3 times a day.     • hydrocodone/acetaminophen (NORCO)  MG Tab Take 1-2 Tabs by mouth every 6 hours as needed for Severe Pain. 20 Tab 0   • Multiple Vitamins-Minerals (CENTRUM SILVER ADULT 50+ PO) Take 1 Tab by mouth every day.     • Milk Thistle (EQL MILK THISTLE) 175 MG Cap Take 1 Cap by mouth every day.     • ascorbic acid (ASCORBIC ACID) 500 MG Tab Take 500 mg by mouth every day.     • L-Arginine 1000 MG Tab Take 1 Cap by mouth every day.     • FISH OIL-KRILL OIL PO Take 1 Cap by mouth every day at 6 PM.     • levothyroxine (SYNTHROID) 125 MCG Tab Take 1 Tab by mouth Every morning on an empty stomach. 90 Tab 0   • vitamin D, Ergocalciferol, (DRISDOL) 36690 units Cap capsule Take 1 Cap by mouth every 7 days. 12 Cap 0   • docusate sodium (STOOL SOFTENER) 100 MG Cap Take 100 mg by mouth 2 times a day.       No current facility-administered medications for this visit.        He  has a past medical history of BPH (benign prostatic hyperplasia); Heart attack (CMS-HCC); Muscle disorder; Neck fracture (CMS-HCC); and Thyroid disease.      ROS  As stated in HPI and additionally  General: +excessive fatigue. No F/C, malaise, weight loss  CV: No chest pain  Pulm: No sob or dyspnea  MSK: No joint erythema or warmth  Skin: No rashes  Endo: +poor concentration, excessive fatigue. No temperature intolerance or skin changes      Objective:     Blood pressure 102/68, pulse 66, temperature 36.7 °C (98.1 °F), resp. rate 12, height 1.753 m (5' 9\"), weight 88.5 kg (195 lb), SpO2 90 %. Body mass index is 28.8 kg/m².  Physical Exam:  General: Alert, oriented, in no acute distress. Appears fatigued. Seated in electric w/c  Eye contact is good, speech goal directed, affect calm  CNs grossly intact.  HEENT: conjunctiva non-injected, sclera non-icteric, EOMs intact. "   Gross hearing intact.  Neck: Supple. No adenopathy or masses in the cervical or supraclavicular regions. No thyromegaly  Lungs: unlabored. clear to auscultation bilaterally with good excursion.  CV: regular rate and rhythm.   Ext: no edema  Skin: No rashes or lesions in visible areas  Gait steady.     Assessment and Plan:   Assessment/Plan:  1. Hypothyroidism due to acquired atrophy of thyroid  Suspect underactive thyroid. Check labs.  - TSH; Future    2. Chronic pain syndrome  Not controlled. Encouraged to follow-up with pain management for more options    3. Reactive depression  The problem to me. Check labs to ensure reading factors. Patient feels that if we find etiology of his current poor concentration and excessive fatigue and he feels like he can emotionally become more stable. Low suicide risk as he does not have any suicidal ideations.  - VITAMIN D,25 HYDROXY; Future  - COMP METABOLIC PANEL; Future  - VITAMIN B12; Future  - Patient has been identified as being depressed and appropriate orders and counseling have been given    4. Vitamin D insufficiency  Check labs to determine supplementation need  - VITAMIN D,25 HYDROXY; Future    5. Insomnia due to medical condition  Discussed need to address pathological concerns. Discussed OTC treatment options. Consider trazodone or amitriptyline. He declines these at this time    6. Chronic fatigue  Check labs. Suspect related to insomnia or uncontrolled thyroid  - CBC WITH DIFFERENTIAL; Future  - COMP METABOLIC PANEL; Future       Follow up:  Return in about 8 weeks (around 11/8/2017).    Educated in proper administration of medication(s) ordered today including safety, possible SE, risks, benefits, rationale and alternatives to therapy.   Supportive care, differential diagnoses, and indications for immediate follow-up discussed with patient.    Pathogenesis of diagnosis discussed including typical length and natural progression.    Instructed to return to clinic  or nearest emergency department for any change in condition, further concerns, or worsening of symptoms.  Patient states understanding of the plan of care and discharge instructions.      Please note that this dictation was created using voice recognition software. I have made every reasonable attempt to correct obvious errors, but I expect that there are errors of grammar and possibly content that I did not discover before finalizing the note.    Followup: Return in about 8 weeks (around 11/8/2017). sooner should new symptoms or problems arise.

## 2017-09-16 NOTE — ASSESSMENT & PLAN NOTE
Has history of vitamin D deficiency last level checked June 2016 low at 29. Not currently on supplementation

## 2017-09-19 ENCOUNTER — TELEPHONE (OUTPATIENT)
Dept: MEDICAL GROUP | Facility: PHYSICIAN GROUP | Age: 62
End: 2017-09-19

## 2017-09-19 RX ORDER — BUPROPION HYDROCHLORIDE 100 MG/1
TABLET, EXTENDED RELEASE ORAL
Qty: 60 TAB | Refills: 0 | Status: SHIPPED | OUTPATIENT
Start: 2017-09-19 | End: 2017-10-12

## 2017-09-19 RX ORDER — BUPROPION HYDROCHLORIDE 100 MG/1
TABLET, EXTENDED RELEASE ORAL
Qty: 60 TAB | Refills: 0 | Status: SHIPPED | OUTPATIENT
Start: 2017-09-19 | End: 2017-09-19 | Stop reason: SDUPTHER

## 2017-09-19 NOTE — TELEPHONE ENCOUNTER
Please call patient to schedule 3 -4 week f/u.         Patient is requesting Adderall for depression. Discussed that this is not for depression. I have recommended wellbutrin. He is in agreement. Medications discussed, asked him to schedule f/u visit in 3 weeks.     JODIE Dewey.

## 2017-09-29 ENCOUNTER — TELEPHONE (OUTPATIENT)
Dept: MEDICAL GROUP | Facility: PHYSICIAN GROUP | Age: 62
End: 2017-09-29

## 2017-09-29 DIAGNOSIS — E55.9 VITAMIN D DEFICIENCY: ICD-10-CM

## 2017-09-29 RX ORDER — TERAZOSIN 10 MG/1
10 CAPSULE ORAL DAILY
Qty: 90 CAP | Refills: 2 | Status: SHIPPED | OUTPATIENT
Start: 2017-09-29 | End: 2018-01-07

## 2017-09-29 RX ORDER — ERGOCALCIFEROL 1.25 MG/1
50000 CAPSULE ORAL
Qty: 12 CAP | Refills: 0 | Status: SHIPPED | OUTPATIENT
Start: 2017-09-29 | End: 2018-01-07

## 2017-10-12 ENCOUNTER — OFFICE VISIT (OUTPATIENT)
Dept: MEDICAL GROUP | Facility: PHYSICIAN GROUP | Age: 62
End: 2017-10-12
Payer: MEDICARE

## 2017-10-12 VITALS
DIASTOLIC BLOOD PRESSURE: 80 MMHG | TEMPERATURE: 97.9 F | HEART RATE: 61 BPM | OXYGEN SATURATION: 99 % | HEIGHT: 69 IN | RESPIRATION RATE: 20 BRPM | BODY MASS INDEX: 28.88 KG/M2 | SYSTOLIC BLOOD PRESSURE: 130 MMHG | WEIGHT: 195 LBS

## 2017-10-12 DIAGNOSIS — E03.4 HYPOTHYROIDISM DUE TO ACQUIRED ATROPHY OF THYROID: ICD-10-CM

## 2017-10-12 DIAGNOSIS — F32.9 REACTIVE DEPRESSION: ICD-10-CM

## 2017-10-12 PROCEDURE — 99213 OFFICE O/P EST LOW 20 MIN: CPT | Performed by: NURSE PRACTITIONER

## 2017-10-12 RX ORDER — NALOXONE HYDROCHLORIDE 4 MG/.1ML
SPRAY NASAL
COMMUNITY
Start: 2017-09-28 | End: 2018-01-07

## 2017-10-12 RX ORDER — HYDRALAZINE HYDROCHLORIDE 25 MG/1
TABLET, FILM COATED ORAL
COMMUNITY
Start: 2017-08-08 | End: 2018-01-07

## 2017-10-12 RX ORDER — LEVOTHYROXINE SODIUM 0.1 MG/1
TABLET ORAL
COMMUNITY
Start: 2017-08-08 | End: 2017-10-12

## 2017-10-12 ASSESSMENT — PATIENT HEALTH QUESTIONNAIRE - PHQ9
5. POOR APPETITE OR OVEREATING: 1 - SEVERAL DAYS
SUM OF ALL RESPONSES TO PHQ QUESTIONS 1-9: 15
CLINICAL INTERPRETATION OF PHQ2 SCORE: 5

## 2017-10-12 NOTE — ASSESSMENT & PLAN NOTE
"Ongoing chronic problem secondary to his chronic quadriplegia. Recently though, his depression has worsened. One month ago I started him on wellbutrin because he was having a lot of depression with fatigue and lack of motivation, but he states wellbutrin made him not feel well causing grogginess.   Treatments failed: Zoloft, Prozac, Elavil, and possibly more. States Prozac caused worsening depression and suicidal thoughts.  Thyroid level was not controlled and vitamin D was low. These have been addressed, and he is improving slightly, but states depression persists.   He has seen therapists in the past to help him cope, but this does not seem to help him. He is not interested in doing this again.   States he does not want any psych meds because \"it is masking the problem.\"  Denies any suicidal thoughts.   "

## 2017-10-12 NOTE — ASSESSMENT & PLAN NOTE
Ongoing chronic problem. Reports he was always taking levothyroxine 150 mcg, but then something happened with medications and mix up resulted him taking only 100 mcg dose daily. TSH on this was 10.3. Levothyroxine was increased to 125 mcg daily. He reports he is noticing that he is feeling better with this change in dosing.    Ref. Range 6/20/2016 10:19 1/1/2017 17:10 9/14/2017 13:01   TSH Latest Ref Range: 0.300 - 3.700 uIU/mL 1.230 0.490 10.300 (H)

## 2017-10-13 ENCOUNTER — TELEPHONE (OUTPATIENT)
Dept: MEDICAL GROUP | Facility: PHYSICIAN GROUP | Age: 62
End: 2017-10-13

## 2017-10-13 RX ORDER — SILDENAFIL 50 MG/1
50 TABLET, FILM COATED ORAL
Qty: 10 TAB | Refills: 0 | Status: SHIPPED | OUTPATIENT
Start: 2017-10-13 | End: 2018-01-07

## 2017-10-13 NOTE — TELEPHONE ENCOUNTER
Please tell him Viagra can be very expensive. I will send Rx and hope insurance helps. Please inform of potential blue vision side effect that is normal.    JODIE Parsons.

## 2017-10-16 RX ORDER — BUPROPION HYDROCHLORIDE 100 MG/1
TABLET, EXTENDED RELEASE ORAL
Qty: 60 TAB | Refills: 0 | OUTPATIENT
Start: 2017-10-16

## 2017-10-17 NOTE — PROGRESS NOTES
"Subjective:     Chief Complaint   Patient presents with   • Depression       HPI  Jesus Pina is a 61 y.o. male here today for depression     Reactive depression  Ongoing chronic problem secondary to his chronic quadriplegia. Recently though, his depression has worsened. One month ago I started him on wellbutrin because he was having a lot of depression with fatigue and lack of motivation, but he states wellbutrin made him not feel well causing grogginess.   Treatments failed: Zoloft, Prozac, Elavil, and possibly more. States Prozac caused worsening depression and suicidal thoughts.  Thyroid level was not controlled and vitamin D was low. These have been addressed, and he is improving slightly, but states depression persists.   He has seen therapists in the past to help him cope, but this does not seem to help him. He is not interested in doing this again.   States he does not want any psych meds because \"it is masking the problem.\"  Denies any suicidal thoughts.     Hypothyroidism due to acquired atrophy of thyroid  Ongoing chronic problem. Reports he was always taking levothyroxine 150 mcg, but then something happened with medications and mix up resulted him taking only 100 mcg dose daily. TSH on this was 10.3. Levothyroxine was increased to 125 mcg daily. He reports he is noticing that he is feeling better with this change in dosing.    Ref. Range 6/20/2016 10:19 1/1/2017 17:10 9/14/2017 13:01   TSH Latest Ref Range: 0.300 - 3.700 uIU/mL 1.230 0.490 10.300 (H)        Diagnoses of Reactive depression and Hypothyroidism due to acquired atrophy of thyroid were pertinent to this visit.    Allergies: Erythromycin; Pcn [penicillins]; and Tdap [dipth, acell pertus, tetanus]  Current medicines (including changes today)  Current Outpatient Prescriptions   Medication Sig Dispense Refill   • hydrALAZINE (APRESOLINE) 25 MG Tab      • NARCAN 4 MG/0.1ML Liquid      • terazosin (HYTRIN) 10 MG capsule Take 1 Cap by mouth every " "day. 90 Cap 2   • vitamin D, Ergocalciferol, (DRISDOL) 44269 units Cap capsule Take 1 Cap by mouth every 7 days. 12 Cap 0   • levothyroxine (SYNTHROID) 125 MCG Tab Take 1 Tab by mouth Every morning on an empty stomach. 90 Tab 0   • baclofen (LIORESAL) 10 MG Tab      • lactulose 10 GM/15ML Solution      • gabapentin (NEURONTIN) 800 MG tablet      • bethanechol (URECHOLINE) 25 MG Tab Take 1 Tab by mouth 3 times a day. 270 Tab 2   • baclofen (LIORESAL) 20 MG tablet Take 1 Tab by mouth 3 times a day. 270 Tab 2   • pregabalin (LYRICA) 50 MG capsule Take 50 mg by mouth 3 times a day.     • hydrocodone/acetaminophen (NORCO)  MG Tab Take 1-2 Tabs by mouth every 6 hours as needed for Severe Pain. 20 Tab 0   • docusate sodium (STOOL SOFTENER) 100 MG Cap Take 100 mg by mouth 2 times a day.     • Multiple Vitamins-Minerals (CENTRUM SILVER ADULT 50+ PO) Take 1 Tab by mouth every day.     • Milk Thistle (EQL MILK THISTLE) 175 MG Cap Take 1 Cap by mouth every day.     • ascorbic acid (ASCORBIC ACID) 500 MG Tab Take 500 mg by mouth every day.     • L-Arginine 1000 MG Tab Take 1 Cap by mouth every day.     • FISH OIL-KRILL OIL PO Take 1 Cap by mouth every day at 6 PM.     • sildenafil citrate (VIAGRA) 50 MG tablet Take 1 Tab by mouth 1 time daily as needed for Erectile Dysfunction. 10 Tab 0     No current facility-administered medications for this visit.        He  has a past medical history of BPH (benign prostatic hyperplasia); Heart attack; Muscle disorder; Neck fracture (CMS-HCC); and Thyroid disease.    ROS  As stated in HPI      Objective:     Blood pressure 130/80, pulse 61, temperature 36.6 °C (97.9 °F), resp. rate 20, height 1.753 m (5' 9\"), weight 88.5 kg (195 lb), SpO2 99 %. Body mass index is 28.8 kg/m².  Physical Exam:  General: Alert, oriented, in no acute distress.  Eye contact is good, speech goal directed, affect calm  CNs grossly intact.  HEENT: conjunctiva non-injected, sclera non-icteric, EOMs intact. "   Gross hearing intact.  Gait not assessed. He is seated in electric w/c    Assessment and Plan:   Assessment/Plan:  1. Reactive depression  Not controlled. Refusing medication. Refusing referral to psych or counseling. Discussed that these are our options, and unfortunately he has to be willing to proceed with one in order for me to help assist him with depression. He continues to decline all options.   - Patient has been identified as being depressed and appropriate orders and counseling have been given    2. Hypothyroidism due to acquired atrophy of thyroid  Not controlled. Due for f/u labs in 2-4 weeks to ensure dose correct. Potentially may need to increase to 135 mcg   - FREE THYROXINE; Future    The total time spent seeing the patient in consultation, and formulating an action plan for this visit was 20 minutes.  Greater than 50% of this time was spent face to face counseling, discussing problems documented above, coordinating care and answering questions by the provider.        Follow up:  Return in about 3 months (around 1/12/2018).    Educated in proper administration of medication(s) ordered today including safety, possible SE, risks, benefits, rationale and alternatives to therapy.   Supportive care, differential diagnoses, and indications for immediate follow-up discussed with patient.    Pathogenesis of diagnosis discussed including typical length and natural progression.    Instructed to return to clinic or nearest emergency department for any change in condition, further concerns, or worsening of symptoms.  Patient states understanding of the plan of care and discharge instructions.      Please note that this dictation was created using voice recognition software. I have made every reasonable attempt to correct obvious errors, but I expect that there are errors of grammar and possibly content that I did not discover before finalizing the note.    Followup: Return in about 3 months (around 1/12/2018).  sooner should new symptoms or problems arise.

## 2017-10-31 PROBLEM — M23.50 CHRONIC INSTABILITY OF KNEE: Status: ACTIVE | Noted: 2017-10-31

## 2017-12-04 ENCOUNTER — TELEPHONE (OUTPATIENT)
Dept: MEDICAL GROUP | Facility: PHYSICIAN GROUP | Age: 62
End: 2017-12-04

## 2017-12-04 DIAGNOSIS — S14.109S QUADRIPLEGIA, POST-TRAUMATIC (HCC): ICD-10-CM

## 2017-12-04 DIAGNOSIS — N31.9 NEUROGENIC BLADDER: ICD-10-CM

## 2017-12-04 DIAGNOSIS — G82.50 QUADRIPLEGIA, POST-TRAUMATIC (HCC): ICD-10-CM

## 2017-12-04 NOTE — TELEPHONE ENCOUNTER
VOICEMAIL  1. Caller Name: Jesus Pina                      Call Back Number: [phone] (home)     2. Message: Pt stated he goofed and is out of Catheters completely, and Esdras Her is waiting for an ok [phone]    3. Patient approves office to leave a detailed voicemail/MyChart message: N\A

## 2018-01-07 ENCOUNTER — RESOLUTE PROFESSIONAL BILLING HOSPITAL PROF FEE (OUTPATIENT)
Dept: HOSPITALIST | Facility: MEDICAL CENTER | Age: 63
End: 2018-01-07
Payer: MEDICARE

## 2018-01-07 ENCOUNTER — APPOINTMENT (OUTPATIENT)
Dept: RADIOLOGY | Facility: MEDICAL CENTER | Age: 63
DRG: 871 | End: 2018-01-07
Attending: EMERGENCY MEDICINE
Payer: MEDICARE

## 2018-01-07 ENCOUNTER — HOSPITAL ENCOUNTER (INPATIENT)
Facility: MEDICAL CENTER | Age: 63
LOS: 2 days | DRG: 871 | End: 2018-01-09
Attending: EMERGENCY MEDICINE | Admitting: INTERNAL MEDICINE
Payer: MEDICARE

## 2018-01-07 DIAGNOSIS — N30.00 ACUTE CYSTITIS WITHOUT HEMATURIA: ICD-10-CM

## 2018-01-07 PROBLEM — N17.9 AKI (ACUTE KIDNEY INJURY) (HCC): Status: ACTIVE | Noted: 2018-01-07

## 2018-01-07 PROBLEM — A41.9 SEPSIS (HCC): Status: ACTIVE | Noted: 2018-01-07

## 2018-01-07 PROBLEM — E87.1 HYPONATREMIA: Status: ACTIVE | Noted: 2018-01-07

## 2018-01-07 PROBLEM — N39.0 COMPLICATED UTI (URINARY TRACT INFECTION): Status: ACTIVE | Noted: 2018-01-07

## 2018-01-07 LAB
ALBUMIN SERPL BCP-MCNC: 4.1 G/DL (ref 3.2–4.9)
ALBUMIN/GLOB SERPL: 1.1 G/DL
ALP SERPL-CCNC: 56 U/L (ref 30–99)
ALT SERPL-CCNC: 24 U/L (ref 2–50)
ANION GAP SERPL CALC-SCNC: 8 MMOL/L (ref 0–11.9)
APPEARANCE UR: ABNORMAL
APTT PPP: 31.8 SEC (ref 24.7–36)
AST SERPL-CCNC: 72 U/L (ref 12–45)
BACTERIA #/AREA URNS HPF: ABNORMAL /HPF
BASOPHILS # BLD AUTO: 0 % (ref 0–1.8)
BASOPHILS # BLD: 0 K/UL (ref 0–0.12)
BILIRUB SERPL-MCNC: 0.9 MG/DL (ref 0.1–1.5)
BILIRUB UR QL STRIP.AUTO: NEGATIVE
BNP SERPL-MCNC: 94 PG/ML (ref 0–100)
BUN SERPL-MCNC: 29 MG/DL (ref 8–22)
CALCIUM SERPL-MCNC: 9.5 MG/DL (ref 8.5–10.5)
CHLORIDE SERPL-SCNC: 95 MMOL/L (ref 96–112)
CO2 SERPL-SCNC: 26 MMOL/L (ref 20–33)
COLOR UR: YELLOW
CREAT SERPL-MCNC: 1.29 MG/DL (ref 0.5–1.4)
CULTURE IF INDICATED INDCX: YES UA CULTURE
EOSINOPHIL # BLD AUTO: 0 K/UL (ref 0–0.51)
EOSINOPHIL NFR BLD: 0 % (ref 0–6.9)
EPI CELLS #/AREA URNS HPF: NEGATIVE /HPF
ERYTHROCYTE [DISTWIDTH] IN BLOOD BY AUTOMATED COUNT: 47.8 FL (ref 35.9–50)
GFR SERPL CREATININE-BSD FRML MDRD: 56 ML/MIN/1.73 M 2
GLOBULIN SER CALC-MCNC: 3.6 G/DL (ref 1.9–3.5)
GLUCOSE SERPL-MCNC: 103 MG/DL (ref 65–99)
GLUCOSE UR STRIP.AUTO-MCNC: NEGATIVE MG/DL
HCT VFR BLD AUTO: 37.1 % (ref 42–52)
HGB BLD-MCNC: 12.3 G/DL (ref 14–18)
HYALINE CASTS #/AREA URNS LPF: ABNORMAL /LPF
INR PPP: 1.23 (ref 0.87–1.13)
KETONES UR STRIP.AUTO-MCNC: 40 MG/DL
LACTATE BLD-SCNC: 1 MMOL/L (ref 0.5–2)
LACTATE BLD-SCNC: 2.5 MMOL/L (ref 0.5–2)
LEUKOCYTE ESTERASE UR QL STRIP.AUTO: ABNORMAL
LYMPHOCYTES # BLD AUTO: 0.89 K/UL (ref 1–4.8)
LYMPHOCYTES NFR BLD: 4.1 % (ref 22–41)
MANUAL DIFF BLD: NORMAL
MCH RBC QN AUTO: 30.8 PG (ref 27–33)
MCHC RBC AUTO-ENTMCNC: 33.2 G/DL (ref 33.7–35.3)
MCV RBC AUTO: 92.8 FL (ref 81.4–97.8)
MICRO URNS: ABNORMAL
MONOCYTES # BLD AUTO: 0.72 K/UL (ref 0–0.85)
MONOCYTES NFR BLD AUTO: 3.3 % (ref 0–13.4)
MORPHOLOGY BLD-IMP: NORMAL
NEUTROPHILS # BLD AUTO: 20.19 K/UL (ref 1.82–7.42)
NEUTROPHILS NFR BLD: 89.3 % (ref 44–72)
NEUTS BAND NFR BLD MANUAL: 3.3 % (ref 0–10)
NITRITE UR QL STRIP.AUTO: POSITIVE
NRBC # BLD AUTO: 0 K/UL
NRBC BLD-RTO: 0 /100 WBC
PH UR STRIP.AUTO: 5 [PH]
PLATELET # BLD AUTO: 187 K/UL (ref 164–446)
PLATELET BLD QL SMEAR: NORMAL
PMV BLD AUTO: 8.6 FL (ref 9–12.9)
POTASSIUM SERPL-SCNC: 3.9 MMOL/L (ref 3.6–5.5)
PROT SERPL-MCNC: 7.7 G/DL (ref 6–8.2)
PROT UR QL STRIP: 100 MG/DL
PROTHROMBIN TIME: 15.2 SEC (ref 12–14.6)
RBC # BLD AUTO: 4 M/UL (ref 4.7–6.1)
RBC # URNS HPF: ABNORMAL /HPF
RBC BLD AUTO: PRESENT
RBC UR QL AUTO: ABNORMAL
SODIUM SERPL-SCNC: 129 MMOL/L (ref 135–145)
SP GR UR STRIP.AUTO: 1.02
TROPONIN I SERPL-MCNC: 0.04 NG/ML (ref 0–0.04)
TSH SERPL DL<=0.005 MIU/L-ACNC: 4.12 UIU/ML (ref 0.38–5.33)
UROBILINOGEN UR STRIP.AUTO-MCNC: 1 MG/DL
VARIANT LYMPHS BLD QL SMEAR: NORMAL
WBC # BLD AUTO: 21.8 K/UL (ref 4.8–10.8)
WBC #/AREA URNS HPF: ABNORMAL /HPF

## 2018-01-07 PROCEDURE — 700105 HCHG RX REV CODE 258: Performed by: EMERGENCY MEDICINE

## 2018-01-07 PROCEDURE — 85007 BL SMEAR W/DIFF WBC COUNT: CPT

## 2018-01-07 PROCEDURE — 81001 URINALYSIS AUTO W/SCOPE: CPT

## 2018-01-07 PROCEDURE — 36415 COLL VENOUS BLD VENIPUNCTURE: CPT

## 2018-01-07 PROCEDURE — 94760 N-INVAS EAR/PLS OXIMETRY 1: CPT

## 2018-01-07 PROCEDURE — 71045 X-RAY EXAM CHEST 1 VIEW: CPT

## 2018-01-07 PROCEDURE — 85730 THROMBOPLASTIN TIME PARTIAL: CPT

## 2018-01-07 PROCEDURE — 70450 CT HEAD/BRAIN W/O DYE: CPT

## 2018-01-07 PROCEDURE — 87186 SC STD MICRODIL/AGAR DIL: CPT

## 2018-01-07 PROCEDURE — 73562 X-RAY EXAM OF KNEE 3: CPT | Mod: RT

## 2018-01-07 PROCEDURE — 96374 THER/PROPH/DIAG INJ IV PUSH: CPT

## 2018-01-07 PROCEDURE — 700105 HCHG RX REV CODE 258: Performed by: INTERNAL MEDICINE

## 2018-01-07 PROCEDURE — A9270 NON-COVERED ITEM OR SERVICE: HCPCS | Performed by: INTERNAL MEDICINE

## 2018-01-07 PROCEDURE — 87040 BLOOD CULTURE FOR BACTERIA: CPT

## 2018-01-07 PROCEDURE — 72131 CT LUMBAR SPINE W/O DYE: CPT

## 2018-01-07 PROCEDURE — 96361 HYDRATE IV INFUSION ADD-ON: CPT

## 2018-01-07 PROCEDURE — 700102 HCHG RX REV CODE 250 W/ 637 OVERRIDE(OP): Performed by: INTERNAL MEDICINE

## 2018-01-07 PROCEDURE — 73502 X-RAY EXAM HIP UNI 2-3 VIEWS: CPT | Mod: RT

## 2018-01-07 PROCEDURE — 87077 CULTURE AEROBIC IDENTIFY: CPT

## 2018-01-07 PROCEDURE — 93005 ELECTROCARDIOGRAM TRACING: CPT | Performed by: EMERGENCY MEDICINE

## 2018-01-07 PROCEDURE — 87086 URINE CULTURE/COLONY COUNT: CPT

## 2018-01-07 PROCEDURE — 83880 ASSAY OF NATRIURETIC PEPTIDE: CPT

## 2018-01-07 PROCEDURE — 84484 ASSAY OF TROPONIN QUANT: CPT

## 2018-01-07 PROCEDURE — 96375 TX/PRO/DX INJ NEW DRUG ADDON: CPT

## 2018-01-07 PROCEDURE — 99285 EMERGENCY DEPT VISIT HI MDM: CPT

## 2018-01-07 PROCEDURE — 80053 COMPREHEN METABOLIC PANEL: CPT

## 2018-01-07 PROCEDURE — 770020 HCHG ROOM/CARE - TELE (206)

## 2018-01-07 PROCEDURE — 700117 HCHG RX CONTRAST REV CODE 255: Performed by: EMERGENCY MEDICINE

## 2018-01-07 PROCEDURE — 700111 HCHG RX REV CODE 636 W/ 250 OVERRIDE (IP): Performed by: INTERNAL MEDICINE

## 2018-01-07 PROCEDURE — 85610 PROTHROMBIN TIME: CPT

## 2018-01-07 PROCEDURE — 83605 ASSAY OF LACTIC ACID: CPT

## 2018-01-07 PROCEDURE — 84443 ASSAY THYROID STIM HORMONE: CPT

## 2018-01-07 PROCEDURE — 99223 1ST HOSP IP/OBS HIGH 75: CPT | Mod: AI | Performed by: INTERNAL MEDICINE

## 2018-01-07 PROCEDURE — 700111 HCHG RX REV CODE 636 W/ 250 OVERRIDE (IP): Performed by: EMERGENCY MEDICINE

## 2018-01-07 PROCEDURE — 85027 COMPLETE CBC AUTOMATED: CPT

## 2018-01-07 PROCEDURE — 74177 CT ABD & PELVIS W/CONTRAST: CPT

## 2018-01-07 RX ORDER — PROMETHAZINE HYDROCHLORIDE 25 MG/1
12.5-25 SUPPOSITORY RECTAL EVERY 4 HOURS PRN
Status: DISCONTINUED | OUTPATIENT
Start: 2018-01-07 | End: 2018-01-09 | Stop reason: HOSPADM

## 2018-01-07 RX ORDER — CEFTRIAXONE 1 G/1
1 INJECTION, POWDER, FOR SOLUTION INTRAMUSCULAR; INTRAVENOUS ONCE
Status: COMPLETED | OUTPATIENT
Start: 2018-01-07 | End: 2018-01-07

## 2018-01-07 RX ORDER — TERAZOSIN 5 MG/1
10 CAPSULE ORAL
Status: DISCONTINUED | OUTPATIENT
Start: 2018-01-07 | End: 2018-01-09 | Stop reason: HOSPADM

## 2018-01-07 RX ORDER — DOCUSATE SODIUM 100 MG/1
100 CAPSULE, LIQUID FILLED ORAL 2 TIMES DAILY
Status: DISCONTINUED | OUTPATIENT
Start: 2018-01-07 | End: 2018-01-07

## 2018-01-07 RX ORDER — SODIUM CHLORIDE 9 MG/ML
INJECTION, SOLUTION INTRAVENOUS CONTINUOUS
Status: DISCONTINUED | OUTPATIENT
Start: 2018-01-07 | End: 2018-01-08

## 2018-01-07 RX ORDER — HEPARIN SODIUM 5000 [USP'U]/ML
5000 INJECTION, SOLUTION INTRAVENOUS; SUBCUTANEOUS EVERY 8 HOURS
Status: DISCONTINUED | OUTPATIENT
Start: 2018-01-07 | End: 2018-01-09 | Stop reason: HOSPADM

## 2018-01-07 RX ORDER — BETHANECHOL CHLORIDE 25 MG/1
25 TABLET ORAL 3 TIMES DAILY
Status: DISCONTINUED | OUTPATIENT
Start: 2018-01-07 | End: 2018-01-09 | Stop reason: HOSPADM

## 2018-01-07 RX ORDER — MORPHINE SULFATE 4 MG/ML
1 INJECTION, SOLUTION INTRAMUSCULAR; INTRAVENOUS EVERY 4 HOURS PRN
Status: DISCONTINUED | OUTPATIENT
Start: 2018-01-07 | End: 2018-01-09 | Stop reason: HOSPADM

## 2018-01-07 RX ORDER — PREGABALIN 25 MG/1
50 CAPSULE ORAL 3 TIMES DAILY
Status: DISCONTINUED | OUTPATIENT
Start: 2018-01-07 | End: 2018-01-09 | Stop reason: HOSPADM

## 2018-01-07 RX ORDER — PROMETHAZINE HYDROCHLORIDE 25 MG/1
12.5-25 TABLET ORAL EVERY 4 HOURS PRN
Status: DISCONTINUED | OUTPATIENT
Start: 2018-01-07 | End: 2018-01-09 | Stop reason: HOSPADM

## 2018-01-07 RX ORDER — AMOXICILLIN 250 MG
2 CAPSULE ORAL 2 TIMES DAILY
Status: DISCONTINUED | OUTPATIENT
Start: 2018-01-07 | End: 2018-01-09 | Stop reason: HOSPADM

## 2018-01-07 RX ORDER — BACLOFEN 10 MG/1
20 TABLET ORAL 3 TIMES DAILY
Status: DISCONTINUED | OUTPATIENT
Start: 2018-01-07 | End: 2018-01-09 | Stop reason: HOSPADM

## 2018-01-07 RX ORDER — SODIUM CHLORIDE 9 MG/ML
2000 INJECTION, SOLUTION INTRAVENOUS ONCE
Status: COMPLETED | OUTPATIENT
Start: 2018-01-07 | End: 2018-01-07

## 2018-01-07 RX ORDER — OXYCODONE HYDROCHLORIDE 10 MG/1
10 TABLET ORAL EVERY 6 HOURS PRN
Status: DISCONTINUED | OUTPATIENT
Start: 2018-01-07 | End: 2018-01-09 | Stop reason: HOSPADM

## 2018-01-07 RX ORDER — ONDANSETRON 4 MG/1
4 TABLET, ORALLY DISINTEGRATING ORAL EVERY 4 HOURS PRN
Status: DISCONTINUED | OUTPATIENT
Start: 2018-01-07 | End: 2018-01-09 | Stop reason: HOSPADM

## 2018-01-07 RX ORDER — POLYETHYLENE GLYCOL 3350 17 G/17G
1 POWDER, FOR SOLUTION ORAL
Status: DISCONTINUED | OUTPATIENT
Start: 2018-01-07 | End: 2018-01-09 | Stop reason: HOSPADM

## 2018-01-07 RX ORDER — ONDANSETRON 2 MG/ML
4 INJECTION INTRAMUSCULAR; INTRAVENOUS EVERY 4 HOURS PRN
Status: DISCONTINUED | OUTPATIENT
Start: 2018-01-07 | End: 2018-01-09 | Stop reason: HOSPADM

## 2018-01-07 RX ORDER — BISACODYL 10 MG
10 SUPPOSITORY, RECTAL RECTAL
Status: DISCONTINUED | OUTPATIENT
Start: 2018-01-07 | End: 2018-01-09 | Stop reason: HOSPADM

## 2018-01-07 RX ORDER — TERAZOSIN 10 MG/1
10 CAPSULE ORAL
COMMUNITY
End: 2018-03-13 | Stop reason: SDUPTHER

## 2018-01-07 RX ORDER — GABAPENTIN 400 MG/1
800 CAPSULE ORAL 3 TIMES DAILY
Status: DISCONTINUED | OUTPATIENT
Start: 2018-01-07 | End: 2018-01-09 | Stop reason: HOSPADM

## 2018-01-07 RX ORDER — SODIUM CHLORIDE 9 MG/ML
500 INJECTION, SOLUTION INTRAVENOUS
Status: COMPLETED | OUTPATIENT
Start: 2018-01-07 | End: 2018-01-07

## 2018-01-07 RX ORDER — GABAPENTIN 800 MG/1
800 TABLET ORAL 3 TIMES DAILY
COMMUNITY

## 2018-01-07 RX ORDER — ACETAMINOPHEN 325 MG/1
650 TABLET ORAL EVERY 6 HOURS PRN
Status: DISCONTINUED | OUTPATIENT
Start: 2018-01-07 | End: 2018-01-09 | Stop reason: HOSPADM

## 2018-01-07 RX ORDER — SODIUM CHLORIDE 9 MG/ML
30 INJECTION, SOLUTION INTRAVENOUS
Status: DISCONTINUED | OUTPATIENT
Start: 2018-01-07 | End: 2018-01-09 | Stop reason: HOSPADM

## 2018-01-07 RX ORDER — LEVOTHYROXINE SODIUM 0.12 MG/1
125 TABLET ORAL
Status: DISCONTINUED | OUTPATIENT
Start: 2018-01-08 | End: 2018-01-09 | Stop reason: HOSPADM

## 2018-01-07 RX ADMIN — ONDANSETRON 4 MG: 2 INJECTION INTRAMUSCULAR; INTRAVENOUS at 20:03

## 2018-01-07 RX ADMIN — SODIUM CHLORIDE: 9 INJECTION, SOLUTION INTRAVENOUS at 23:43

## 2018-01-07 RX ADMIN — PREGABALIN 50 MG: 25 CAPSULE ORAL at 20:03

## 2018-01-07 RX ADMIN — BACLOFEN 20 MG: 10 TABLET ORAL at 20:03

## 2018-01-07 RX ADMIN — CEFTRIAXONE SODIUM 1 G: 1 INJECTION, POWDER, FOR SOLUTION INTRAMUSCULAR; INTRAVENOUS at 16:19

## 2018-01-07 RX ADMIN — GABAPENTIN 800 MG: 400 CAPSULE ORAL at 20:03

## 2018-01-07 RX ADMIN — SODIUM CHLORIDE 2000 ML: 9 INJECTION, SOLUTION INTRAVENOUS at 15:35

## 2018-01-07 RX ADMIN — CEFEPIME 2 G: 2 INJECTION, POWDER, FOR SOLUTION INTRAMUSCULAR; INTRAVENOUS at 21:07

## 2018-01-07 RX ADMIN — HEPARIN SODIUM 5000 UNITS: 5000 INJECTION, SOLUTION INTRAVENOUS; SUBCUTANEOUS at 20:03

## 2018-01-07 RX ADMIN — IOHEXOL 100 ML: 350 INJECTION, SOLUTION INTRAVENOUS at 16:57

## 2018-01-07 RX ADMIN — SODIUM CHLORIDE 1000 ML: 9 INJECTION, SOLUTION INTRAVENOUS at 20:05

## 2018-01-07 RX ADMIN — OXYCODONE HYDROCHLORIDE 10 MG: 10 TABLET ORAL at 22:12

## 2018-01-07 RX ADMIN — SODIUM CHLORIDE 500 ML: 9 INJECTION, SOLUTION INTRAVENOUS at 22:03

## 2018-01-07 RX ADMIN — STANDARDIZED SENNA CONCENTRATE AND DOCUSATE SODIUM 2 TABLET: 8.6; 5 TABLET, FILM COATED ORAL at 23:22

## 2018-01-07 RX ADMIN — ACETAMINOPHEN 650 MG: 325 TABLET, FILM COATED ORAL at 20:02

## 2018-01-07 ASSESSMENT — COPD QUESTIONNAIRES
DURING THE PAST 4 WEEKS HOW MUCH DID YOU FEEL SHORT OF BREATH: NONE/LITTLE OF THE TIME
DO YOU EVER COUGH UP ANY MUCUS OR PHLEGM?: NO/ONLY WITH OCCASIONAL COLDS OR INFECTIONS
HAVE YOU SMOKED AT LEAST 100 CIGARETTES IN YOUR ENTIRE LIFE: YES
COPD SCREENING SCORE: 4

## 2018-01-07 ASSESSMENT — ENCOUNTER SYMPTOMS
NAUSEA: 0
WEIGHT LOSS: 0
LOSS OF CONSCIOUSNESS: 0
WEAKNESS: 1
VOMITING: 0
FEVER: 0
NECK PAIN: 1
DIZZINESS: 0
SHORTNESS OF BREATH: 0
FALLS: 1
ABDOMINAL PAIN: 0
HEMOPTYSIS: 0
DEPRESSION: 0
CHILLS: 0
DOUBLE VISION: 0
BLURRED VISION: 0
MYALGIAS: 0
FOCAL WEAKNESS: 0
BACK PAIN: 1
PALPITATIONS: 0
SPUTUM PRODUCTION: 0
FLANK PAIN: 0

## 2018-01-07 ASSESSMENT — COGNITIVE AND FUNCTIONAL STATUS - GENERAL
SUGGESTED CMS G CODE MODIFIER MOBILITY: CM
DRESSING REGULAR LOWER BODY CLOTHING: TOTAL
TURNING FROM BACK TO SIDE WHILE IN FLAT BAD: A LOT
SUGGESTED CMS G CODE MODIFIER DAILY ACTIVITY: CN
HELP NEEDED FOR BATHING: TOTAL
STANDING UP FROM CHAIR USING ARMS: TOTAL
EATING MEALS: TOTAL
CLIMB 3 TO 5 STEPS WITH RAILING: TOTAL
MOVING FROM LYING ON BACK TO SITTING ON SIDE OF FLAT BED: UNABLE
MOVING TO AND FROM BED TO CHAIR: UNABLE
WALKING IN HOSPITAL ROOM: TOTAL
PERSONAL GROOMING: TOTAL
DAILY ACTIVITIY SCORE: 6
DRESSING REGULAR UPPER BODY CLOTHING: TOTAL
TOILETING: TOTAL
MOBILITY SCORE: 7

## 2018-01-07 ASSESSMENT — LIFESTYLE VARIABLES
ALCOHOL_USE: NO
EVER_SMOKED: YES
EVER_SMOKED: YES

## 2018-01-07 ASSESSMENT — PATIENT HEALTH QUESTIONNAIRE - PHQ9
2. FEELING DOWN, DEPRESSED, IRRITABLE, OR HOPELESS: NOT AT ALL
SUM OF ALL RESPONSES TO PHQ QUESTIONS 1-9: 0
1. LITTLE INTEREST OR PLEASURE IN DOING THINGS: NOT AT ALL
SUM OF ALL RESPONSES TO PHQ9 QUESTIONS 1 AND 2: 0

## 2018-01-07 NOTE — ED NOTES
Patient arrives via EMS [facility] from home for GLF x 3. Patient is chairbound due to spinal problems and states he fell when attempting to transfer from scooter to chair. Patient is alert and oriented x 4. Patient has numerous complaints:    Fall  Head pain  Neck pain  Left arm pain  Chest wall pain  Rash on chest  Abdominal pain  Nausea  Increasing left leg weakness    Patient hypotensive on arrival.

## 2018-01-08 LAB
ANION GAP SERPL CALC-SCNC: 8 MMOL/L (ref 0–11.9)
APPEARANCE UR: CLEAR
BACTERIA #/AREA URNS HPF: NEGATIVE /HPF
BASOPHILS # BLD AUTO: 0 % (ref 0–1.8)
BASOPHILS # BLD: 0 K/UL (ref 0–0.12)
BILIRUB UR QL STRIP.AUTO: NEGATIVE
BUN SERPL-MCNC: 20 MG/DL (ref 8–22)
CALCIUM SERPL-MCNC: 8.1 MG/DL (ref 8.5–10.5)
CHLORIDE SERPL-SCNC: 104 MMOL/L (ref 96–112)
CO2 SERPL-SCNC: 22 MMOL/L (ref 20–33)
COLOR UR: YELLOW
CREAT SERPL-MCNC: 0.68 MG/DL (ref 0.5–1.4)
EOSINOPHIL # BLD AUTO: 0 K/UL (ref 0–0.51)
EOSINOPHIL NFR BLD: 0 % (ref 0–6.9)
EPI CELLS #/AREA URNS HPF: ABNORMAL /HPF
ERYTHROCYTE [DISTWIDTH] IN BLOOD BY AUTOMATED COUNT: 47.8 FL (ref 35.9–50)
GFR SERPL CREATININE-BSD FRML MDRD: >60 ML/MIN/1.73 M 2
GLUCOSE SERPL-MCNC: 149 MG/DL (ref 65–99)
GLUCOSE UR STRIP.AUTO-MCNC: NEGATIVE MG/DL
HCT VFR BLD AUTO: 31.4 % (ref 42–52)
HGB BLD-MCNC: 10.4 G/DL (ref 14–18)
HYALINE CASTS #/AREA URNS LPF: ABNORMAL /LPF
KETONES UR STRIP.AUTO-MCNC: ABNORMAL MG/DL
LEUKOCYTE ESTERASE UR QL STRIP.AUTO: ABNORMAL
LYMPHOCYTES # BLD AUTO: 1.1 K/UL (ref 1–4.8)
LYMPHOCYTES NFR BLD: 6.1 % (ref 22–41)
MANUAL DIFF BLD: NORMAL
MCH RBC QN AUTO: 30.6 PG (ref 27–33)
MCHC RBC AUTO-ENTMCNC: 33.1 G/DL (ref 33.7–35.3)
MCV RBC AUTO: 92.4 FL (ref 81.4–97.8)
MICRO URNS: ABNORMAL
MONOCYTES # BLD AUTO: 0.31 K/UL (ref 0–0.85)
MONOCYTES NFR BLD AUTO: 1.7 % (ref 0–13.4)
MORPHOLOGY BLD-IMP: NORMAL
NEUTROPHILS # BLD AUTO: 16.69 K/UL (ref 1.82–7.42)
NEUTROPHILS NFR BLD: 92.2 % (ref 44–72)
NITRITE UR QL STRIP.AUTO: NEGATIVE
NRBC # BLD AUTO: 0 K/UL
NRBC BLD-RTO: 0 /100 WBC
PH UR STRIP.AUTO: 5.5 [PH]
PLATELET # BLD AUTO: 160 K/UL (ref 164–446)
PLATELET BLD QL SMEAR: NORMAL
PMV BLD AUTO: 9.2 FL (ref 9–12.9)
POTASSIUM SERPL-SCNC: 3.2 MMOL/L (ref 3.6–5.5)
PROT UR QL STRIP: 30 MG/DL
RBC # BLD AUTO: 3.4 M/UL (ref 4.7–6.1)
RBC # URNS HPF: ABNORMAL /HPF
RBC BLD AUTO: NORMAL
RBC UR QL AUTO: ABNORMAL
SODIUM SERPL-SCNC: 134 MMOL/L (ref 135–145)
SP GR UR STRIP.AUTO: 1.03
UROBILINOGEN UR STRIP.AUTO-MCNC: 1 MG/DL
WBC # BLD AUTO: 18.1 K/UL (ref 4.8–10.8)
WBC #/AREA URNS HPF: ABNORMAL /HPF

## 2018-01-08 PROCEDURE — 700102 HCHG RX REV CODE 250 W/ 637 OVERRIDE(OP): Performed by: INTERNAL MEDICINE

## 2018-01-08 PROCEDURE — 700102 HCHG RX REV CODE 250 W/ 637 OVERRIDE(OP): Performed by: FAMILY MEDICINE

## 2018-01-08 PROCEDURE — A9270 NON-COVERED ITEM OR SERVICE: HCPCS | Performed by: INTERNAL MEDICINE

## 2018-01-08 PROCEDURE — 85027 COMPLETE CBC AUTOMATED: CPT

## 2018-01-08 PROCEDURE — 700105 HCHG RX REV CODE 258: Performed by: INTERNAL MEDICINE

## 2018-01-08 PROCEDURE — 700111 HCHG RX REV CODE 636 W/ 250 OVERRIDE (IP): Performed by: FAMILY MEDICINE

## 2018-01-08 PROCEDURE — 85007 BL SMEAR W/DIFF WBC COUNT: CPT

## 2018-01-08 PROCEDURE — 700111 HCHG RX REV CODE 636 W/ 250 OVERRIDE (IP): Performed by: INTERNAL MEDICINE

## 2018-01-08 PROCEDURE — 81001 URINALYSIS AUTO W/SCOPE: CPT

## 2018-01-08 PROCEDURE — 36415 COLL VENOUS BLD VENIPUNCTURE: CPT

## 2018-01-08 PROCEDURE — A9270 NON-COVERED ITEM OR SERVICE: HCPCS | Performed by: FAMILY MEDICINE

## 2018-01-08 PROCEDURE — 99232 SBSQ HOSP IP/OBS MODERATE 35: CPT | Performed by: FAMILY MEDICINE

## 2018-01-08 PROCEDURE — 770020 HCHG ROOM/CARE - TELE (206)

## 2018-01-08 PROCEDURE — 80048 BASIC METABOLIC PNL TOTAL CA: CPT

## 2018-01-08 RX ORDER — PREGABALIN 25 MG/1
50 CAPSULE ORAL ONCE
Status: COMPLETED | OUTPATIENT
Start: 2018-01-08 | End: 2018-01-08

## 2018-01-08 RX ORDER — POTASSIUM CHLORIDE 20 MEQ/1
40 TABLET, EXTENDED RELEASE ORAL ONCE
Status: COMPLETED | OUTPATIENT
Start: 2018-01-09 | End: 2018-01-08

## 2018-01-08 RX ADMIN — BETHANECHOL CHLORIDE 25 MG: 25 TABLET ORAL at 15:41

## 2018-01-08 RX ADMIN — TERAZOSIN HYDROCHLORIDE ANHYDROUS 10 MG: 5 CAPSULE ORAL at 17:54

## 2018-01-08 RX ADMIN — SODIUM CHLORIDE: 9 INJECTION, SOLUTION INTRAVENOUS at 06:55

## 2018-01-08 RX ADMIN — CEFEPIME 2 G: 2 INJECTION, POWDER, FOR SOLUTION INTRAMUSCULAR; INTRAVENOUS at 21:00

## 2018-01-08 RX ADMIN — OXYCODONE HYDROCHLORIDE 10 MG: 10 TABLET ORAL at 13:13

## 2018-01-08 RX ADMIN — PREGABALIN 50 MG: 25 CAPSULE ORAL at 15:40

## 2018-01-08 RX ADMIN — MORPHINE SULFATE 1 MG: 4 INJECTION INTRAVENOUS at 11:02

## 2018-01-08 RX ADMIN — HEPARIN SODIUM 5000 UNITS: 5000 INJECTION, SOLUTION INTRAVENOUS; SUBCUTANEOUS at 06:42

## 2018-01-08 RX ADMIN — BACLOFEN 20 MG: 10 TABLET ORAL at 17:54

## 2018-01-08 RX ADMIN — BACLOFEN 20 MG: 10 TABLET ORAL at 15:41

## 2018-01-08 RX ADMIN — HEPARIN SODIUM 5000 UNITS: 5000 INJECTION, SOLUTION INTRAVENOUS; SUBCUTANEOUS at 13:13

## 2018-01-08 RX ADMIN — LEVOTHYROXINE SODIUM 125 MCG: 125 TABLET ORAL at 06:44

## 2018-01-08 RX ADMIN — CEFEPIME 2 G: 2 INJECTION, POWDER, FOR SOLUTION INTRAMUSCULAR; INTRAVENOUS at 06:47

## 2018-01-08 RX ADMIN — BETHANECHOL CHLORIDE 25 MG: 25 TABLET ORAL at 00:41

## 2018-01-08 RX ADMIN — PREGABALIN 50 MG: 25 CAPSULE ORAL at 08:31

## 2018-01-08 RX ADMIN — GABAPENTIN 800 MG: 400 CAPSULE ORAL at 08:31

## 2018-01-08 RX ADMIN — POTASSIUM CHLORIDE 40 MEQ: 1500 TABLET, EXTENDED RELEASE ORAL at 23:59

## 2018-01-08 RX ADMIN — HEPARIN SODIUM 5000 UNITS: 5000 INJECTION, SOLUTION INTRAVENOUS; SUBCUTANEOUS at 21:17

## 2018-01-08 RX ADMIN — GABAPENTIN 800 MG: 400 CAPSULE ORAL at 15:40

## 2018-01-08 RX ADMIN — PREGABALIN 50 MG: 25 CAPSULE ORAL at 21:17

## 2018-01-08 RX ADMIN — OXYCODONE HYDROCHLORIDE 10 MG: 10 TABLET ORAL at 06:56

## 2018-01-08 RX ADMIN — GABAPENTIN 800 MG: 400 CAPSULE ORAL at 17:54

## 2018-01-08 RX ADMIN — BETHANECHOL CHLORIDE 25 MG: 25 TABLET ORAL at 08:31

## 2018-01-08 RX ADMIN — STANDARDIZED SENNA CONCENTRATE AND DOCUSATE SODIUM 2 TABLET: 8.6; 5 TABLET, FILM COATED ORAL at 08:32

## 2018-01-08 RX ADMIN — BETHANECHOL CHLORIDE 25 MG: 25 TABLET ORAL at 17:54

## 2018-01-08 RX ADMIN — BACLOFEN 20 MG: 10 TABLET ORAL at 08:32

## 2018-01-08 RX ADMIN — STANDARDIZED SENNA CONCENTRATE AND DOCUSATE SODIUM 2 TABLET: 8.6; 5 TABLET, FILM COATED ORAL at 21:18

## 2018-01-08 ASSESSMENT — ENCOUNTER SYMPTOMS
ORTHOPNEA: 0
PHOTOPHOBIA: 0
TINGLING: 0
NECK PAIN: 1
WEIGHT LOSS: 0
WEAKNESS: 1
HEADACHES: 0
VOMITING: 0
PALPITATIONS: 0
SPUTUM PRODUCTION: 0
BLURRED VISION: 0
BACK PAIN: 1
CHILLS: 1
HEARTBURN: 0
COUGH: 0
DIZZINESS: 0
NAUSEA: 0
HEMOPTYSIS: 0
DOUBLE VISION: 0
DIAPHORESIS: 0

## 2018-01-08 ASSESSMENT — PAIN SCALES - GENERAL
PAINLEVEL_OUTOF10: 7
PAINLEVEL_OUTOF10: 5
PAINLEVEL_OUTOF10: 7
PAINLEVEL_OUTOF10: 7

## 2018-01-08 NOTE — PROGRESS NOTES
[facility] Progress Note    Date of Service: 2018    Chief Complaint  62 y.o. male admitted 2018 with UTI INDUCED SEPSIS    Interval Problem Update  NONE    Consultants/Specialty  NONE    Disposition  PENDING        Review of Systems   Constitutional: Positive for chills. Negative for diaphoresis and weight loss.   HENT: Negative for ear pain, hearing loss and tinnitus.    Eyes: Negative for blurred vision, double vision and photophobia.   Respiratory: Negative for cough, hemoptysis and sputum production.    Cardiovascular: Negative for chest pain, palpitations and orthopnea.   Gastrointestinal: Negative for heartburn, nausea and vomiting.   Genitourinary: Negative for frequency and urgency.   Musculoskeletal: Positive for back pain, joint pain and neck pain.   Skin: Negative for itching and rash.   Neurological: Positive for weakness. Negative for dizziness, tingling and headaches.      Physical Exam  Laboratory/Imaging   Hemodynamics  Temp (24hrs), Av.8 °C (98.2 °F), Min:36.2 °C (97.2 °F), Max:37.5 °C (99.5 °F)   Temperature: 37.5 °C (99.5 °F)  Pulse  Av.5  Min: 69  Max: 89 Heart Rate (Monitored): 73  Blood Pressure: 100/46, NIBP: (!) 98/53      Respiratory      Respiration: 16, Pulse Oximetry: 93 %, O2 Daily Delivery Respiratory : Room Air with O2 Available        RUL Breath Sounds: Diminished, RML Breath Sounds: Diminished, RLL Breath Sounds: Diminished, SANJAY Breath Sounds: Diminished, LLL Breath Sounds: Diminished    Fluids    Intake/Output Summary (Last 24 hours) at 18 1055  Last data filed at 18 0800   Gross per 24 hour   Intake              240 ml   Output             1400 ml   Net            -1160 ml       Nutrition  Orders Placed This Encounter   Procedures   • Diet Order     Standing Status:   Standing     Number of Occurrences:   1     Order Specific Question:   Diet:     Answer:   Regular [1]     Physical Exam   Constitutional: He is oriented to person, place, and  time. No distress.   HENT:   Head: Normocephalic and atraumatic.   Eyes: Right eye exhibits no discharge. Left eye exhibits no discharge.   Neck: Neck supple. No JVD present.   Cardiovascular: Normal rate, regular rhythm and normal heart sounds.    Pulmonary/Chest: Effort normal. No stridor. No respiratory distress. He has no wheezes. He has no rales.   Abdominal: Soft. There is no tenderness. There is no rebound and no guarding.   Musculoskeletal: He exhibits no tenderness.   Neurological: He is alert and oriented to person, place, and time.   Skin: Skin is warm and dry. He is not diaphoretic.       Recent Labs      01/07/18   1522  01/08/18   0201   WBC  21.8*  18.1*   RBC  4.00*  3.40*   HEMOGLOBIN  12.3*  10.4*   HEMATOCRIT  37.1*  31.4*   MCV  92.8  92.4   MCH  30.8  30.6   MCHC  33.2*  33.1*   RDW  47.8  47.8   PLATELETCT  187  160*   MPV  8.6*  9.2     Recent Labs      01/07/18   1522  01/08/18   0201   SODIUM  129*  134*   POTASSIUM  3.9  3.2*   CHLORIDE  95*  104   CO2  26  22   GLUCOSE  103*  149*   BUN  29*  20   CREATININE  1.29  0.68   CALCIUM  9.5  8.1*     Recent Labs      01/07/18   1522   APTT  31.8   INR  1.23*     Recent Labs      01/07/18   1522   BNPBTYPENAT  94              Assessment/Plan     * Complicated UTI (urinary tract infection)- (present on admission)   Assessment & Plan    IV CEFEPIME        TAY (acute kidney injury) (CMS-HCC)- (present on admission)   Assessment & Plan    2ND TO DEHYDRATION  S/P IV FLUID  HAS RESOLVED        Hyponatremia- (present on admission)   Assessment & Plan    IV FLUID  HAS RESOLVED        Sepsis (CMS-HCC)- (present on admission)   Assessment & Plan    -This is severe sepsis with the following associated acute organ dysfunction(s): acute kidney failure.   -2ND TO UTI  IV CEFEPIME        Opioid type dependence, continuous (CMS-HCC)- (present on admission)   Assessment & Plan    -Continue outpatient narcotics        Hypothyroidism due to acquired atrophy of  thyroid- (present on admission)   Assessment & Plan    -Replacement with Synthroid        Chronic pain- (present on admission)   Assessment & Plan    -A bit below his baseline given recent fall and concurrent sepsis  -Continue outpatient neuropathic and narcotic pain medications in introduce low-dose IV morphine as needed for breakthrough pain        Quadriplegia, post-traumatic (CMS-HCC)- (present on admission)   Assessment & Plan    -Appears at his baseline but continues to have frequent falls especially on transfers  -PT OT and  consult            DVT prophylaxis pharmacological::  Heparin

## 2018-01-08 NOTE — H&P
HOSPITAL MEDICINE HISTORY/ PHYSICAL    Date of Service:  1/7/2018   8:15 PM       Patient ID:   Name: Jesus Pina  . YOB: 1955. Age: 62 y.o. male. MRN: [id number]    Admitting Attending:  Sigifredo Mcmillan     PCP : AALIYAH Parsons          Chief Complaint:       Weakness and generalized body pain    History of Present Illness:    Yovani is a 62 y.o. male w/h/o posttraumatic quadriplegia who presents with above chief complaint. Patient states that over the last 2 days had increasing generalized weakness and inability to transfer out of bed to his wheelchair which she normally can do about effort. He does have a caretaker at home but she is only there during the day and he says he's been bedridden for the last 2 nights for 12 hours but denies any skin breakdown as far as he can tell. Uses a condom catheter only and hasn't noticed any change in color or burning with urination and denies any blood in stool or urine. His appetite and poor diet he feels somewhat dizzy lightheaded but no loss of consciousness. Denies any new skin rashes or lesions. He says his chronic pain is much worse than usual and he feels totally out of it. He said during a transfer this morning from the bed to the wheelchair he fell and hit the back of his back has had and has generalized pain.    Review of Systems:    Has Review of Systems   Constitutional: Positive for malaise/fatigue. Negative for chills, fever and weight loss.   HENT: Negative for hearing loss.    Eyes: Negative for blurred vision and double vision.   Respiratory: Negative for hemoptysis, sputum production and shortness of breath.    Cardiovascular: Positive for chest pain. Negative for palpitations and leg swelling.   Gastrointestinal: Negative for abdominal pain, nausea and vomiting.   Genitourinary: Negative for dysuria, flank pain, frequency, hematuria and urgency.   Musculoskeletal: Positive for back pain, falls, joint pain and neck pain. Negative for  "myalgias.   Skin: Negative for itching.   Neurological: Positive for weakness. Negative for dizziness, focal weakness and loss of consciousness.   Endo/Heme/Allergies: Negative for environmental allergies.   Psychiatric/Behavioral: Negative for depression.   All other systems reviewed and are negative.    Please see HPI, all other systems were reviewed and are negative (AMA/CMS criteria)              Past Medical/ Family / Social history (PFSH):   Past Medical History:   Diagnosis Date   • BPH (benign prostatic hyperplasia)    • Heart attack     age early 40s    • Muscle disorder     high functioning quadraplegic    • Neck fracture (CMS-HCC)    • Thyroid disease        Past Surgical History:   Procedure Laterality Date   • FULL THICKNESS SKIN GRAFT  2000    foot   • LAMINOTOMY      repair neck fracture: total of 3 surgeries        Current Outpatient Medications:  No current facility-administered medications on file prior to encounter.      Current Outpatient Prescriptions on File Prior to Encounter   Medication Sig Dispense Refill   • levothyroxine (SYNTHROID) 125 MCG Tab Take 1 Tab by mouth Every morning on an empty stomach. 90 Tab 0   • bethanechol (URECHOLINE) 25 MG Tab Take 1 Tab by mouth 3 times a day. 270 Tab 2   • baclofen (LIORESAL) 20 MG tablet Take 1 Tab by mouth 3 times a day. 270 Tab 2   • pregabalin (LYRICA) 50 MG capsule Take 50 mg by mouth 3 times a day.     • docusate sodium (STOOL SOFTENER) 100 MG Cap Take 100 mg by mouth 2 times a day.         Medication Allergy/Sensitivities:  Allergies   Allergen Reactions   • Erythromycin Unspecified     \"It chokes me\"   • Pcn [Penicillins] Rash     Develops a rash with some PCN derivatives.    • Tdap [Dipth, Acell Pertus, Tetanus] Swelling     Neck Swelling       Family History:  Family History   Problem Relation Age of Onset   • Cancer Mother      gallbladder   • Cancer Father      colon and prostate   • Stroke Father    • Heart Attack Neg Hx     ry:  Family " "History   Problem Relation Age of Onset   • Cancer Mother      gallbladder   • Cancer Father      colon and prostate   • Stroke Father    • Heart Attack Neg Hx        Social History:  Social History   Substance Use Topics   • Smoking status: Never Smoker   • Smokeless tobacco: Never Used   • Alcohol use No     #################################################################  Physical Exam:   Vitals/ General Appearance:   Weight/BMI: Body mass index is 28.78 kg/m².  Blood pressure 121/75, pulse 86, temperature 36.2 °C (97.2 °F), resp. rate 17, height 1.753 m (5' 9\"), weight 88.4 kg (194 lb 14.2 oz), SpO2 96 %.   Vitals:    01/07/18 1845 01/07/18 1903 01/07/18 1907 01/07/18 1930   BP:   121/75    Pulse: 87 80  86   Resp: 18 (!) 21  17   Temp:       SpO2: 93% 96%  96%   Weight:       Height:        Oxygen Therapy:  Pulse Oximetry: 96 %    Constitutional:  Wrapped in blankets but is alert and conversant anal ×4  HENMT: Normocephalic, atraumatic, b/l ears normal, nose normal  Eyes:  EOMI, conjunctiva normal, no discharge  Neck: no tracheal deviation, supple  Cardiovascular: normal heart rate, normal rhythm, no murmurs, no rubs or gallops; no cyanosis, clubbing or edema  Lungs: Respiratory effort is normal, normal breath sounds, breath sounds clear to auscultation b/l, no rales, rhonchi or wheezing  Abdomen: soft, non-tender, no guarding or rebound  Skin: warm, dry, no erythema, no rash  -condom cath in place of normal yellow urine in collecting bag, no scrotal or penile lesions appreciated  Neurologic: Alert and oriented, strength 0-5 showing throughout given his quadriplegia, no focal deficits, CN II-XII normal  Psychiatric: No anxiety or depression    #################################################################  Lab Data Review:    Objective   Recent Results (from the past 24 hour(s))   CBC WITH DIFFERENTIAL    Collection Time: 01/07/18  3:22 PM   Result Value Ref Range    WBC 21.8 (H) 4.8 - 10.8 K/uL    RBC " 4.00 (L) 4.70 - 6.10 M/uL    Hemoglobin 12.3 (L) 14.0 - 18.0 g/dL    Hematocrit 37.1 (L) 42.0 - 52.0 %    MCV 92.8 81.4 - 97.8 fL    MCH 30.8 27.0 - 33.0 pg    MCHC 33.2 (L) 33.7 - 35.3 g/dL    RDW 47.8 35.9 - 50.0 fL    Platelet Count 187 164 - 446 K/uL    MPV 8.6 (L) 9.0 - 12.9 fL    Nucleated RBC 0.00 /100 WBC    NRBC (Absolute) 0.00 K/uL    Neutrophils-Polys 89.30 (H) 44.00 - 72.00 %    Lymphocytes 4.10 (L) 22.00 - 41.00 %    Monocytes 3.30 0.00 - 13.40 %    Eosinophils 0.00 0.00 - 6.90 %    Basophils 0.00 0.00 - 1.80 %    Neutrophils (Absolute) 20.19 (H) 1.82 - 7.42 K/uL    Lymphs (Absolute) 0.89 (L) 1.00 - 4.80 K/uL    Monos (Absolute) 0.72 0.00 - 0.85 K/uL    Eos (Absolute) 0.00 0.00 - 0.51 K/uL    Baso (Absolute) 0.00 0.00 - 0.12 K/uL   COMP METABOLIC PANEL    Collection Time: 01/07/18  3:22 PM   Result Value Ref Range    Sodium 129 (L) 135 - 145 mmol/L    Potassium 3.9 3.6 - 5.5 mmol/L    Chloride 95 (L) 96 - 112 mmol/L    Co2 26 20 - 33 mmol/L    Anion Gap 8.0 0.0 - 11.9    Glucose 103 (H) 65 - 99 mg/dL    Bun 29 (H) 8 - 22 mg/dL    Creatinine 1.29 0.50 - 1.40 mg/dL    Calcium 9.5 8.5 - 10.5 mg/dL    AST(SGOT) 72 (H) 12 - 45 U/L    ALT(SGPT) 24 2 - 50 U/L    Alkaline Phosphatase 56 30 - 99 U/L    Total Bilirubin 0.9 0.1 - 1.5 mg/dL    Albumin 4.1 3.2 - 4.9 g/dL    Total Protein 7.7 6.0 - 8.2 g/dL    Globulin 3.6 (H) 1.9 - 3.5 g/dL    A-G Ratio 1.1 g/dL   PROTHROMBIN TIME    Collection Time: 01/07/18  3:22 PM   Result Value Ref Range    PT 15.2 (H) 12.0 - 14.6 sec    INR 1.23 (H) 0.87 - 1.13   APTT    Collection Time: 01/07/18  3:22 PM   Result Value Ref Range    APTT 31.8 24.7 - 36.0 sec   TROPONIN    Collection Time: 01/07/18  3:22 PM   Result Value Ref Range    Troponin I 0.04 0.00 - 0.04 ng/mL   BTYPE NATRIURETIC PEPTIDE    Collection Time: 01/07/18  3:22 PM   Result Value Ref Range    B Natriuretic Peptide 94 0 - 100 pg/mL   TSH    Collection Time: 01/07/18  3:22 PM   Result Value Ref Range    TSH  4.120 0.380 - 5.330 uIU/mL   LACTIC ACID    Collection Time: 01/07/18  3:22 PM   Result Value Ref Range    Lactic Acid 2.5 (H) 0.5 - 2.0 mmol/L   ESTIMATED GFR    Collection Time: 01/07/18  3:22 PM   Result Value Ref Range    GFR If African American >60 >60 mL/min/1.73 m 2    GFR If Non  56 (A) >60 mL/min/1.73 m 2   DIFFERENTIAL MANUAL    Collection Time: 01/07/18  3:22 PM   Result Value Ref Range    Bands-Stabs 3.30 0.00 - 10.00 %    Manual Diff Status PERFORMED    PERIPHERAL SMEAR REVIEW    Collection Time: 01/07/18  3:22 PM   Result Value Ref Range    Peripheral Smear Review see below    PLATELET ESTIMATE    Collection Time: 01/07/18  3:22 PM   Result Value Ref Range    Plt Estimation Normal    MORPHOLOGY    Collection Time: 01/07/18  3:22 PM   Result Value Ref Range    RBC Morphology Present     Reactive Lymphocytes Few    URINALYSIS CULTURE, IF INDICATED    Collection Time: 01/07/18  3:36 PM   Result Value Ref Range    Color Yellow     Character Cloudy (A)     Specific Gravity 1.023 <1.035    Ph 5.0 5.0 - 8.0    Glucose Negative Negative mg/dL    Ketones 40 (A) Negative mg/dL    Protein 100 (A) Negative mg/dL    Bilirubin Negative Negative    Urobilinogen, Urine 1.0 Negative    Nitrite Positive (A) Negative    Leukocyte Esterase Moderate (A) Negative    Occult Blood Large (A) Negative    Micro Urine Req Microscopic     Culture Indicated Yes UA Culture   URINE MICROSCOPIC (W/UA)    Collection Time: 01/07/18  3:36 PM   Result Value Ref Range    WBC Packed WBC /hpf    RBC 5-10 (A) /hpf    Bacteria Moderate (A) None /hpf    Epithelial Cells Negative /hpf    Hyaline Cast 3-5 (A) /lpf   EKG (NOW)    Collection Time: 01/07/18  4:18 PM   Result Value Ref Range    Report       [facility] Emergency [facility]    Test Date:  2018-01-07  Pt Name:    SRIDHAR BELLA                  Department: ER  MRN:        [id number]                      Room:       RD 10  Gender:     Male                          Technician: EDSFHR  :        1955                   Requested By:RODDY MATT  Order #:    [id number]                    Reading MD:    Measurements  Intervals                                Axis  Rate:       78                           P:  GA:                                      QRS:        -43  QRSD:       118                          T:          32  QT:         448  QTc:        511    Interpretive Statements  ACCELERATED JUNCTIONAL RHYTHM  NONSPECIFIC IVCD WITH LAD  BORDERLINE R WAVE PROGRESSION, ANTERIOR LEADS  ARTIFACT IN LEAD(S) I,aVR,V1,V2,V3,V4,V5,V6  Compared to ECG 2017 17:34:56  Accelerated junctional rhythm now present  Intraventricular conduction delay now present  Sinus bradycardia no longer present  Left anterior fascicular blo ck no longer present  T-wave abnormality no longer present         (click the triangle to expand results)    My interpretation of lab results:     Imaging/Procedures Review:    DX-KNEE 3 VIEWS RIGHT   Final Result      No acute right knee fracture or dislocation.      DX-HIP-COMPLETE - UNILATERAL 2+ RIGHT   Final Result      No radiographic evidence of acute traumatic injury right hip.      DX-CHEST-PORTABLE (1 VIEW)   Final Result      No acute cardiac or pulmonary abnormality is noted.      CT-ABDOMEN-PELVIS WITH   Final Result      No posttraumatic abnormality identified in the abdomen or pelvis.      CT-LSPINE W/O PLUS RECONS   Final Result      1.  No acute fracture is identified.      2.  Severe multilevel degenerative disc disease.      3.  Status post L2-3 fusion.      4.  Multilevel facet arthropathy with resulting foraminal narrowing.      5.  Atherosclerosis.      CT-HEAD W/O   Final Result      No evidence of acute intracranial process.          EKG:   per my independent read:  Heart rate 78 normal sinus rhythm no evidence of acute ST segment changes appreciated    Assessment and Plan:      * Complicated UTI (urinary tract infection)- (present on  admission)   Assessment & Plan    -Likely culprit of sepsis  -Is fluid responsive  -Continue IV cefepime and follow up urine cultures and adjust and antibiotics as needed        TAY (acute kidney injury) (CMS-HCC)- (present on admission)   Assessment & Plan    -Mild and likely secondary to sepsis and dehydration  -Continue IV fluids normal saline at 125 mL per hour and avoid all nephrotoxins  -Monitor creatinine closely  -Consider bladder scan to maintain condom catheter        Hyponatremia- (present on admission)   Assessment & Plan    -Secondary to hypovolemia  -Continue IV fluids with normal saline and check sodium level in the morning        Sepsis (CMS-HCC)- (present on admission)   Assessment & Plan    -This is severe sepsis with the following associated acute organ dysfunction(s): acute kidney failure.   -Secondary to complicated UTI  -Initiate sepsis protocol  -IV cefepime  -Trend lactic acid until below 2        Opioid type dependence, continuous (CMS-HCC)- (present on admission)   Assessment & Plan    -Continue outpatient narcotics        Hypothyroidism due to acquired atrophy of thyroid- (present on admission)   Assessment & Plan    -Replacement with Synthroid        Chronic pain- (present on admission)   Assessment & Plan    -A bit below his baseline given recent fall and concurrent sepsis  -Continue outpatient neuropathic and narcotic pain medications in introduce low-dose IV morphine as needed for breakthrough pain        Quadriplegia, post-traumatic (CMS-HCC)- (present on admission)   Assessment & Plan    -Appears at his baseline but continues to have frequent falls especially on transfers  -PT OT and  consult              1. Prophylaxis: sc heparin  2. Code: Full code per patient with himself present  3. Dispo: He will be admitted to inpatient f hello or management that is expected to take greater than 2 midnights    This dictation was created using voice recognition software. The  accuracy of the dictation is limited to the abilities of the software. I expect there may be some errors of grammar and possibly content.

## 2018-01-08 NOTE — PROGRESS NOTES
Paged on-call hospitalist Arabella Olivares regarding pt's repeat blood culture orders. Okay to cancel repeat orders per on-call hospitalist as blood cultures have already been drawn. Will cancel orders.

## 2018-01-08 NOTE — ED NOTES
Pt laying on gurney, eyes closed, even respirations, on cardiac monitor.   Chart up for re-evaluation

## 2018-01-08 NOTE — ASSESSMENT & PLAN NOTE
-A bit below his baseline given recent fall and concurrent sepsis  -Continue outpatient neuropathic and narcotic pain medications in introduce low-dose IV morphine as needed for breakthrough pain

## 2018-01-08 NOTE — CARE PLAN
Problem: Safety  Goal: Will remain free from falls  Outcome: PROGRESSING AS EXPECTED  Patient educated about risk of falls and reasoning for use of tread socks, calling for help, and bed alarms.       Problem: Knowledge Deficit  Goal: Knowledge of disease process/condition, treatment plan, diagnostic tests, and medications will improve  Outcome: PROGRESSING AS EXPECTED  Patient educated regarding medications, procedures and plan of care.

## 2018-01-08 NOTE — ASSESSMENT & PLAN NOTE
-Appears at his baseline but continues to have frequent falls especially on transfers  -PT OT and  consult

## 2018-01-08 NOTE — ASSESSMENT & PLAN NOTE
-This is severe sepsis with the following associated acute organ dysfunction(s): acute kidney failure.   -2ND TO UTI  IV CEFEPIME

## 2018-01-08 NOTE — ED NOTES
Pt a moderate amount of soft brown BM in bed, pt changed and cleaned to dry sheets. Pt tolerated well.

## 2018-01-08 NOTE — PROGRESS NOTES
2 RN skin check completed with Teodora FREEMAN: R elbow red and blanching. Abrasion/skin tear to L elbow. Dressing replaced with comfeel transparent dressing. Small rash to L anterior rib cage. Small bruise to R interior thigh. Bilateral feet red, blanching, dry, flaky, calloused. Heels floated on pillow. Scabbing/abrasion to R inner ankle. Callous/scabbing behind R knee. Scattered scabbing to bilateral lower extremities. Red, nonblanching spot to back of L ankle. Calloused area behind R knee with scabbing. Blanchable redness/scarring with scab to R knee. Bottom red, but blanching with two areas of nonblanching redness. Red, nonblanching spot to L upper back. Pictures taken of all nonblanching spots/wounds/abrasions. Pt placed on waffle mattress and turned on R side (Q2H turns initiated). No other skin abnormalities noted. Will upload wound pictures and place wound consult.

## 2018-01-08 NOTE — PROGRESS NOTES
Fiath Parker Fall Risk Assessment:     Last Known Fall: Within the last month  Mobility: Hemiplegic, paraplegia, or quadriplegia  Medications: Cardiovascular and central nervous system meds  Mental Status/LOC/Awareness: Awake, alert, and oriented to date, place, and person  Toileting Needs: Use of catheters or diversion devices, Use of assistive device (Bedside commode, bedpan, urinal)  Volume/Electrolyte Status: Use of IV fluids/tube feeds  Communication/Sensory: Visual (Glasses)/hearing deficit  Behavior: Appropriate behavior  Faith Parker Fall Risk Total: 18  Fall Risk Level: HIGH RISK    Universal Fall Precautions:  call light/belongings in reach, bed in low position and locked, wheelchairs and assistive devices out of sight, siderails up x 2, use non-slip footwear, adequate lighting, clutter free and spill free environment, educate on level of risk, educate to call for assistance    Fall Risk Level Interventions:     TRIAL (TELE 8, NEURO, MED CHRIS 5) High Fall Risk Interventions  Place yellow fall risk ID band on patient: completed  Provide patient/family education based on risk assessment: completed  Educate patient/family to call staff for assistance when getting out of bed: completed  Place fall precaution signage outside patient door: completed  Place patient in room close to nursing station: completed  Utilize bed/chair fall alarm: completed  Notify charge of high risk for huddle: completed    Patient Specific Interventions:     Medication: review medications with patient and family, assess for medications that can be discontinued or dosage decreased and limit combination of prn medications  Mental Status/LOC/Awareness: reinforce falls education, check on patient hourly, utilize bed/chair fall alarm, reinforce the use of call light and provide activity  Toileting: provide frquent toileting, monitor intake and output/use of appropriate interventions and instruct patient/family on the use of grab  bars  Volume/Electrolyte Status: ensure patient remains hydrated, monitor abnormal lab values and ensure IV fluids are appropriate  Communication/Sensory: update plan of care on whiteboard, ensure proper positioning when transferrng/ambulating and for visually impaired patients orient to their room surrounding and do not change their surroundings  Behavioral: encourage patient to voice feelings, engage patient in daily activities, administer medication as ordered and instruct/reinforce fall program rationale  Mobility: schedule physical activity throughout the day, utilize bed/chair fall alarm, ensure bed is locked and in lowest position, provide appropriate assistive device and instruct patient to exit bed on their strongest side

## 2018-01-08 NOTE — DISCHARGE PLANNING
Medical Social Work    SW attempted to meet with pt at bedside as he reportedly has animals at home that need to be cared for. When YE responded to bedside pt was no longer in room. YE will f/u.

## 2018-01-08 NOTE — ED NOTES
Pt given water with straw, cath yanes kinked and repositioned, pt felt warm temp is 102.8f. Pt medicated see MAR

## 2018-01-08 NOTE — PROGRESS NOTES
Pt arrived to floor by guero with zoll via myself. Pt care assumed. Monitor room notified of pt arrival to floor and name verified on monitor- pt HR SR 70. Patient assessed. A&O x 4. No distress present. Call light, phone, and bedside table within reach. White board updated and plan of care discussed with patient. Bed alarm and strip alarm set and in place. Pt calls for assistance appropriately. No concerns present at this time. Will continue to monitor.

## 2018-01-08 NOTE — ED NOTES
Med rec updated and complete.  Allergies reviewed.  Met with pt at bedside and dicussed current medications and last doses taken.  Pt stated that his pain management doctor allows him to alternate between gabapentin  And pregablin depending on what his pain level is.  Pt denies antibiotic use in last 30 days.

## 2018-01-08 NOTE — DISCHARGE PLANNING
Medical Social Work    SW attempted to meet with pt at bedside to gather information and pt was sleeping. SW to attempt again shortly.

## 2018-01-08 NOTE — PROGRESS NOTES
[facility] Fall Risk Assessment:     Last Known Fall: Within the last month  Mobility: Hemiplegic, paraplegia, or quadriplegia  Medications: Cardiovascular and central nervous system meds  Mental Status/LOC/Awareness: Awake, alert, and oriented to date, place, and person  Toileting Needs: Use of catheters or diversion devices  Volume/Electrolyte Status: Use of IV fluids/tube feeds  Communication/Sensory: Visual (Glasses)/hearing deficit  Behavior: Appropriate behavior  Faith Parker Fall Risk Total: 16  Fall Risk Level: HIGH RISK    Universal Fall Precautions:  call light/belongings in reach, bed in low position and locked, wheelchairs and assistive devices out of sight, siderails up x 2, use non-slip footwear    Fall Risk Level Interventions:     TRIAL (TELE 8, NEURO, MED CHRIS 5) High Fall Risk Interventions  Place yellow fall risk ID band on patient: completed  Provide patient/family education based on risk assessment: completed  Educate patient/family to call staff for assistance when getting out of bed: completed  Place fall precaution signage outside patient door: completed  Place patient in room close to nursing station: completed  Utilize bed/chair fall alarm: completed  Notify charge of high risk for huddle: completed    Patient Specific Interventions:     Medication: review medications with patient and family  Mental Status/LOC/Awareness: reinforce falls education  Toileting: consider obtaining elevated toilet seat or bedside commode (BSC)  Volume/Electrolyte Status: ensure patient remains hydrated  Communication/Sensory: update plan of care on whiteboard  Behavioral: encourage patient to voice feelings  Mobility: schedule physical activity throughout the day

## 2018-01-08 NOTE — ED PROVIDER NOTES
ED Provider Note    CHIEF COMPLAINT  Chief Complaint   Patient presents with   • GLF   • Weakness   • Rash   • Abdominal Pain   • Nausea   • Pain   • Abrasion       HPI  Jesus Pina is a 62 y.o. male who presentsTo the emergency department complaining of generalized weakness and several episodes of falling. The patient says that for the last 3-4 days has been very weak he says he is having trouble getting out of bed and he has fallen several times trying to transfer from his bed to his wheelchair. He fell once while trying to transfer alone this morning and he fell again when his visiting caregiver came to help him. He complains of mild pain in the right hip and right knee but says that he also has low back discomfort and abdominal pain after the fall. He does not recognize any other exacerbating or relieving factors or precipitating events.    REVIEW OF SYSTEMS no recent fever or chills he has nausea but no vomiting. All other systems negative    PAST MEDICAL HISTORY  Past Medical History:   Diagnosis Date   • BPH (benign prostatic hyperplasia)    • Heart attack     age early 40s    • Muscle disorder     high functioning quadraplegic    • Neck fracture (CMS-HCC)    • Thyroid disease        FAMILY HISTORY  Family History   Problem Relation Age of Onset   • Cancer Mother      gallbladder   • Cancer Father      colon and prostate   • Stroke Father    • Heart Attack Neg Hx        SOCIAL HISTORY  Social History     Social History   • Marital status: Single     Spouse name: N/A   • Number of children: N/A   • Years of education: N/A     Social History Main Topics   • Smoking status: Never Smoker   • Smokeless tobacco: Never Used   • Alcohol use No   • Drug use:      Types: Marijuana      Comment: states he has used marijuana snacks in the past month    • Sexual activity: No     Other Topics Concern   • Not on file     Social History Narrative    Patient is single and has one son [place] in the [place]. He was in a home  "and has a permanent caretaker       SURGICAL HISTORY  Past Surgical History:   Procedure Laterality Date   • FULL THICKNESS SKIN GRAFT  2000    foot   • LAMINOTOMY      repair neck fracture: total of 3 surgeries        CURRENT MEDICATIONS  Home Medications    **Home medications have not yet been reviewed for this encounter**         ALLERGIES  Allergies   Allergen Reactions   • Erythromycin Unspecified     \"It chokes me\"   • Pcn [Penicillins] Rash     Develops a rash with some PCN derivatives.    • Tdap [Dipth, Acell Pertus, Tetanus] Swelling     Neck Swelling       PHYSICAL EXAM  VITAL SIGNS: BP (!) 85/46   Pulse 74   Temp 36.2 °C (97.2 °F)   Resp 18   Ht 1.753 m (5' 9\")   Wt 88.4 kg (194 lb 14.2 oz)   SpO2 96%   BMI 28.78 kg/m²    Oxygen saturation is interpreted asAdequate  Constitutional: Awake verbal talkative but otherwise nontoxic appearing  HENT: Mucous membranes are moist  Eyes: No erythema or discharge or jaundice  Neck: Trachea midline no JVD  Cardiovascular: Regular rate and rhythm  Lungs: Clear and equal bilaterally  Abdomen/Back: Soft nontender nondistended no peritoneal findings  Skin: Warm and dry  Musculoskeletal: The patient has contractures of the upper extremities and muscle wasting in the upper and lower extremities which is chronic and consistent with his history of quadriplegia.  Neurologic: The patient is awake and talkative and lucid he has no movement of the lower extremities he does have some movement and shoulder shrugging of the upper extremities which is baseline he says.    Laboratory  A CBC shows elevated white blood cell count of 21.8 hemoglobin is adequate at 12.3 basic metabolic panels unremarkable LFTs are unremarkable lactic acid level is elevated at 2.5 troponin is at the upper limits of normal at 0.04 INR is 1.29 TSH is 4.12 urinalysis nitrite is positive leukocyte esterases positive microscopic exam showed a field packed with white blood cells and moderate bacteria. A " culture was initiated by the laboratory.    EKG interpretation  A 12-lead EKG showed baseline artifact due to tremor there is a left axis deviation there was no diagnostic ST elevation.    Radiology  DX-KNEE 3 VIEWS RIGHT   Final Result      No acute right knee fracture or dislocation.      DX-HIP-COMPLETE - UNILATERAL 2+ RIGHT   Final Result      No radiographic evidence of acute traumatic injury right hip.      DX-CHEST-PORTABLE (1 VIEW)   Final Result      No acute cardiac or pulmonary abnormality is noted.      CT-ABDOMEN-PELVIS WITH   Final Result      No posttraumatic abnormality identified in the abdomen or pelvis.      CT-LSPINE W/O PLUS RECONS   Final Result      1.  No acute fracture is identified.      2.  Severe multilevel degenerative disc disease.      3.  Status post L2-3 fusion.      4.  Multilevel facet arthropathy with resulting foraminal narrowing.      5.  Atherosclerosis.      CT-HEAD W/O   Final Result      No evidence of acute intracranial process.        MEDICAL DECISION MAKING and DISPOSITION  In the emergency department an IV was established and the patient placed on a cardiac monitor he was given intravenous fluids which has greatly improved his blood pressure and he was given intravenous ceftriaxone. I reviewed the findings with the patient and I reviewed the case with the hospitalist and the patient is admitted to the hospitalist service for further evaluation and treatment    IMPRESSION  1. Urinary tract infection  2. Frequent falling  3. Sepsis         Electronically signed by: Sonny Monterroso, 1/7/2018 6:12 PM

## 2018-01-08 NOTE — CARE PLAN
Problem: Safety  Goal: Will remain free from injury  Outcome: PROGRESSING AS EXPECTED  Pt remains free from falls or injuries. Bed alarm and strip alarm set and pt calls for assistance appropriately.     Problem: Venous Thromboembolism (VTW)/Deep Vein Thrombosis (DVT) Prevention:  Goal: Patient will participate in Venous Thrombosis (VTE)/Deep Vein Thrombosis (DVT)Prevention Measures  Outcome: PROGRESSING AS EXPECTED  Pt free from s/sx of DVT. Pt receiving heparin for VTE prophylaxis.     Problem: Pain Management  Goal: Pain level will decrease to patient's comfort goal  Outcome: PROGRESSING AS EXPECTED  Pt resting comfortably in bed at this time. Pt pain managed by PRN pain medications and pt calls for pain management appropriately.

## 2018-01-09 VITALS
DIASTOLIC BLOOD PRESSURE: 84 MMHG | BODY MASS INDEX: 27.89 KG/M2 | TEMPERATURE: 99.6 F | SYSTOLIC BLOOD PRESSURE: 137 MMHG | OXYGEN SATURATION: 97 % | HEIGHT: 69 IN | WEIGHT: 188.27 LBS | HEART RATE: 62 BPM | RESPIRATION RATE: 16 BRPM

## 2018-01-09 PROBLEM — A41.9 SEPSIS (HCC): Status: RESOLVED | Noted: 2018-01-07 | Resolved: 2018-01-09

## 2018-01-09 LAB
ANION GAP SERPL CALC-SCNC: 7 MMOL/L (ref 0–11.9)
BACTERIA UR CULT: ABNORMAL
BACTERIA UR CULT: ABNORMAL
BASOPHILS # BLD AUTO: 0.3 % (ref 0–1.8)
BASOPHILS # BLD: 0.03 K/UL (ref 0–0.12)
BUN SERPL-MCNC: 11 MG/DL (ref 8–22)
CALCIUM SERPL-MCNC: 8.6 MG/DL (ref 8.5–10.5)
CHLORIDE SERPL-SCNC: 108 MMOL/L (ref 96–112)
CO2 SERPL-SCNC: 23 MMOL/L (ref 20–33)
CREAT SERPL-MCNC: 0.63 MG/DL (ref 0.5–1.4)
EOSINOPHIL # BLD AUTO: 0.05 K/UL (ref 0–0.51)
EOSINOPHIL NFR BLD: 0.4 % (ref 0–6.9)
ERYTHROCYTE [DISTWIDTH] IN BLOOD BY AUTOMATED COUNT: 47.8 FL (ref 35.9–50)
GLUCOSE SERPL-MCNC: 88 MG/DL (ref 65–99)
HCT VFR BLD AUTO: 32.7 % (ref 42–52)
HGB BLD-MCNC: 10.8 G/DL (ref 14–18)
IMM GRANULOCYTES # BLD AUTO: 0.06 K/UL (ref 0–0.11)
IMM GRANULOCYTES NFR BLD AUTO: 0.5 % (ref 0–0.9)
LYMPHOCYTES # BLD AUTO: 0.94 K/UL (ref 1–4.8)
LYMPHOCYTES NFR BLD: 8.4 % (ref 22–41)
MAGNESIUM SERPL-MCNC: 1.9 MG/DL (ref 1.5–2.5)
MCH RBC QN AUTO: 30.2 PG (ref 27–33)
MCHC RBC AUTO-ENTMCNC: 33 G/DL (ref 33.7–35.3)
MCV RBC AUTO: 91.3 FL (ref 81.4–97.8)
MONOCYTES # BLD AUTO: 0.71 K/UL (ref 0–0.85)
MONOCYTES NFR BLD AUTO: 6.4 % (ref 0–13.4)
NEUTROPHILS # BLD AUTO: 9.35 K/UL (ref 1.82–7.42)
NEUTROPHILS NFR BLD: 84 % (ref 44–72)
NRBC # BLD AUTO: 0 K/UL
NRBC BLD-RTO: 0 /100 WBC
PLATELET # BLD AUTO: 171 K/UL (ref 164–446)
PMV BLD AUTO: 8.9 FL (ref 9–12.9)
POTASSIUM SERPL-SCNC: 4 MMOL/L (ref 3.6–5.5)
RBC # BLD AUTO: 3.58 M/UL (ref 4.7–6.1)
SIGNIFICANT IND 70042: ABNORMAL
SITE SITE: ABNORMAL
SODIUM SERPL-SCNC: 138 MMOL/L (ref 135–145)
SOURCE SOURCE: ABNORMAL
WBC # BLD AUTO: 11.1 K/UL (ref 4.8–10.8)

## 2018-01-09 PROCEDURE — A9270 NON-COVERED ITEM OR SERVICE: HCPCS | Performed by: INTERNAL MEDICINE

## 2018-01-09 PROCEDURE — G8988 SELF CARE GOAL STATUS: HCPCS | Mod: CK

## 2018-01-09 PROCEDURE — 700111 HCHG RX REV CODE 636 W/ 250 OVERRIDE (IP): Performed by: INTERNAL MEDICINE

## 2018-01-09 PROCEDURE — 99239 HOSP IP/OBS DSCHRG MGMT >30: CPT | Performed by: HOSPITALIST

## 2018-01-09 PROCEDURE — 700111 HCHG RX REV CODE 636 W/ 250 OVERRIDE (IP): Performed by: FAMILY MEDICINE

## 2018-01-09 PROCEDURE — 97535 SELF CARE MNGMENT TRAINING: CPT

## 2018-01-09 PROCEDURE — 97162 PT EVAL MOD COMPLEX 30 MIN: CPT

## 2018-01-09 PROCEDURE — 85025 COMPLETE CBC W/AUTO DIFF WBC: CPT

## 2018-01-09 PROCEDURE — 51798 US URINE CAPACITY MEASURE: CPT

## 2018-01-09 PROCEDURE — G8979 MOBILITY GOAL STATUS: HCPCS | Mod: CJ

## 2018-01-09 PROCEDURE — 36415 COLL VENOUS BLD VENIPUNCTURE: CPT

## 2018-01-09 PROCEDURE — 97167 OT EVAL HIGH COMPLEX 60 MIN: CPT

## 2018-01-09 PROCEDURE — 80048 BASIC METABOLIC PNL TOTAL CA: CPT

## 2018-01-09 PROCEDURE — G8987 SELF CARE CURRENT STATUS: HCPCS | Mod: CL

## 2018-01-09 PROCEDURE — 700102 HCHG RX REV CODE 250 W/ 637 OVERRIDE(OP): Performed by: HOSPITALIST

## 2018-01-09 PROCEDURE — G8978 MOBILITY CURRENT STATUS: HCPCS | Mod: CM

## 2018-01-09 PROCEDURE — A9270 NON-COVERED ITEM OR SERVICE: HCPCS | Performed by: HOSPITALIST

## 2018-01-09 PROCEDURE — 83735 ASSAY OF MAGNESIUM: CPT

## 2018-01-09 PROCEDURE — 700102 HCHG RX REV CODE 250 W/ 637 OVERRIDE(OP): Performed by: INTERNAL MEDICINE

## 2018-01-09 RX ORDER — TAMSULOSIN HYDROCHLORIDE 0.4 MG/1
0.4 CAPSULE ORAL
Qty: 30 CAP | Refills: 0 | Status: SHIPPED | OUTPATIENT
Start: 2018-01-10

## 2018-01-09 RX ORDER — POTASSIUM CHLORIDE 20 MEQ/1
40 TABLET, EXTENDED RELEASE ORAL ONCE
Status: DISCONTINUED | OUTPATIENT
Start: 2018-01-09 | End: 2018-01-09 | Stop reason: HOSPADM

## 2018-01-09 RX ORDER — CIPROFLOXACIN 500 MG/1
500 TABLET, FILM COATED ORAL EVERY 12 HOURS
Status: DISCONTINUED | OUTPATIENT
Start: 2018-01-09 | End: 2018-01-09 | Stop reason: HOSPADM

## 2018-01-09 RX ORDER — TAMSULOSIN HYDROCHLORIDE 0.4 MG/1
0.4 CAPSULE ORAL
Status: DISCONTINUED | OUTPATIENT
Start: 2018-01-09 | End: 2018-01-09 | Stop reason: HOSPADM

## 2018-01-09 RX ORDER — CIPROFLOXACIN 500 MG/1
500 TABLET, FILM COATED ORAL EVERY 12 HOURS
Qty: 8 TAB | Refills: 0 | Status: SHIPPED | OUTPATIENT
Start: 2018-01-09 | End: 2018-01-13

## 2018-01-09 RX ADMIN — OXYCODONE HYDROCHLORIDE 10 MG: 10 TABLET ORAL at 09:05

## 2018-01-09 RX ADMIN — CEFEPIME 2 G: 2 INJECTION, POWDER, FOR SOLUTION INTRAMUSCULAR; INTRAVENOUS at 07:52

## 2018-01-09 RX ADMIN — HEPARIN SODIUM 5000 UNITS: 5000 INJECTION, SOLUTION INTRAVENOUS; SUBCUTANEOUS at 05:36

## 2018-01-09 RX ADMIN — STANDARDIZED SENNA CONCENTRATE AND DOCUSATE SODIUM 2 TABLET: 8.6; 5 TABLET, FILM COATED ORAL at 07:52

## 2018-01-09 RX ADMIN — MORPHINE SULFATE 1 MG: 4 INJECTION INTRAVENOUS at 02:54

## 2018-01-09 RX ADMIN — PREGABALIN 50 MG: 25 CAPSULE ORAL at 13:27

## 2018-01-09 RX ADMIN — HEPARIN SODIUM 5000 UNITS: 5000 INJECTION, SOLUTION INTRAVENOUS; SUBCUTANEOUS at 13:27

## 2018-01-09 RX ADMIN — BETHANECHOL CHLORIDE 25 MG: 25 TABLET ORAL at 05:36

## 2018-01-09 RX ADMIN — BACLOFEN 20 MG: 10 TABLET ORAL at 05:35

## 2018-01-09 RX ADMIN — BACLOFEN 20 MG: 10 TABLET ORAL at 13:27

## 2018-01-09 RX ADMIN — TAMSULOSIN HYDROCHLORIDE 0.4 MG: 0.4 CAPSULE ORAL at 13:27

## 2018-01-09 RX ADMIN — TERAZOSIN HYDROCHLORIDE ANHYDROUS 10 MG: 5 CAPSULE ORAL at 05:35

## 2018-01-09 RX ADMIN — GABAPENTIN 800 MG: 400 CAPSULE ORAL at 05:35

## 2018-01-09 RX ADMIN — PREGABALIN 50 MG: 25 CAPSULE ORAL at 05:35

## 2018-01-09 RX ADMIN — GABAPENTIN 800 MG: 400 CAPSULE ORAL at 13:27

## 2018-01-09 RX ADMIN — BETHANECHOL CHLORIDE 25 MG: 25 TABLET ORAL at 13:27

## 2018-01-09 RX ADMIN — LEVOTHYROXINE SODIUM 125 MCG: 125 TABLET ORAL at 05:36

## 2018-01-09 ASSESSMENT — COGNITIVE AND FUNCTIONAL STATUS - GENERAL
DRESSING REGULAR UPPER BODY CLOTHING: A LOT
HELP NEEDED FOR BATHING: A LOT
DRESSING REGULAR LOWER BODY CLOTHING: TOTAL
STANDING UP FROM CHAIR USING ARMS: A LOT
SUGGESTED CMS G CODE MODIFIER MOBILITY: CM
TURNING FROM BACK TO SIDE WHILE IN FLAT BAD: UNABLE
PERSONAL GROOMING: A LOT
CLIMB 3 TO 5 STEPS WITH RAILING: TOTAL
MOVING TO AND FROM BED TO CHAIR: UNABLE
WALKING IN HOSPITAL ROOM: TOTAL
MOVING FROM LYING ON BACK TO SITTING ON SIDE OF FLAT BED: UNABLE
MOBILITY SCORE: 7
DAILY ACTIVITIY SCORE: 11
EATING MEALS: A LOT
TOILETING: A LOT
SUGGESTED CMS G CODE MODIFIER DAILY ACTIVITY: CL

## 2018-01-09 ASSESSMENT — GAIT ASSESSMENTS: GAIT LEVEL OF ASSIST: UNABLE TO PARTICIPATE

## 2018-01-09 ASSESSMENT — PAIN SCALES - GENERAL
PAINLEVEL_OUTOF10: 6
PAINLEVEL_OUTOF10: 4

## 2018-01-09 ASSESSMENT — ACTIVITIES OF DAILY LIVING (ADL): TOILETING: REQUIRES ASSIST

## 2018-01-09 NOTE — WOUND TEAM
Patient seen by wound team per consult order. Patient has dry scar tissue to back of left shoulder, intact dry scab to left elbow and left knee. Patient has dry abrasion to left buttock. All areas are open to air. Patient is on a waffle overlay and encouraged to turn every 2 hours, with assistance of nursing staff. Discussed POC with patient and staff RN. No advanced wound care at this time.

## 2018-01-09 NOTE — THERAPY
"Physical Therapy Evaluation completed.   Bed Mobility:   Supine<>sit: moderate assist from raised HOB, has a wedge pillow at home    Transfers:  Sit<>stand: does not achieve full standing, extension synergy noted but scoots with min assist          Plan of Care: Will benefit from Physical Therapy 3 times per week  Discharge Recommendations: Equipment: Will Continue to Assess for Equipment Needs.     Pt presents with impaired activity tolerance, LE strength, spasticity, new learning and safety awareness s/p UTI in chronic (11 year) quadraplegia. Pt is most limited by new learning, relatively pleasant but very perseverative and does not internalize current weakness. Question cognitive deficits or hx of TBI? Pt reporting he will be returning home no matter what our recommendations will be; he is a high risk for recurrent UTIs and falls given his baseline level of function/safety awareness. Pt is a poor therapy candidate given lack of internalization of need for therapy. In this setting, his bed mobility/scooter transfer cannot be recreated accurately enough to determine home safety transfers and would need home health PT to determine transfer safety. Will follow if remains in house.      See \"Rehab Therapy-Acute\" Patient Summary Report for complete documentation.     "

## 2018-01-09 NOTE — DISCHARGE SUMMARY
CHIEF COMPLAINT ON ADMISSION  Chief Complaint   Patient presents with   • GLF   • Weakness   • Rash   • Abdominal Pain   • Nausea   • Pain   • Abrasion       CODE STATUS  Full Code    HPI & HOSPITAL COURSE  This is a 62 y.o. male here with Weakness abdominal pain associated with nausea and vomiting. Patient was found t have complicated UTI upon presentation likely secondary to urinary tract infection. Patient admitted on 1/7/2018. Patient has a full-time caretaker at home during the day, she helps him with transfers. He is noticed to have decreased strength she brought him into the emergency room at that time. He's also been experiencing some urinary retention. As well as fevers chills or sweats. This was resolved her hospital stay as he was found to have urinary tract infection and started on cefepime. Urine cultures resulted sensitive to most antibiotics started on oral ciprofloxacin completion of a total 7 day course. Physical therapy saw patient hospital stay. Discharge and wanted to return to ER precautions. I also started him on tamsulosin for urinary retention.. He is also follow-up with urologist and primary care physician given the likelihood of enlarged prostate..     Therefore, he is discharged in good and stable condition with close outpatient follow-up.    SPECIFIC OUTPATIENT FOLLOW-UP  Urology 1-2 weeks  PCP 1 week    DISCHARGE PROBLEM LIST  Principal Problem:    Complicated UTI (urinary tract infection) POA: Yes  Active Problems:    Quadriplegia, post-traumatic (CMS-HCC) POA: Yes    Chronic pain POA: Yes    Hypothyroidism due to acquired atrophy of thyroid POA: Yes    Opioid type dependence, continuous (CMS-HCC) POA: Yes      Overview: Pain Contract with Dr. Pickard    Hyponatremia POA: Yes    TAY (acute kidney injury) (CMS-HCC) POA: Yes  Resolved Problems:    Sepsis (CMS-HCC) POA: Yes      FOLLOW UP  No future appointments.  No follow-up provider specified.    MEDICATIONS ON DISCHARGE   Jesus Pina    Home Medication Instructions ALICIA:[id number]    Printed on:01/09/18 6274   Medication Information                      baclofen (LIORESAL) 20 MG tablet  Take 1 Tab by mouth 3 times a day.             bethanechol (URECHOLINE) 25 MG Tab  Take 1 Tab by mouth 3 times a day.             ciprofloxacin (CIPRO) 500 MG Tab  Take 1 Tab by mouth every 12 hours for 4 days.             docusate sodium (STOOL SOFTENER) 100 MG Cap  Take 100 mg by mouth 2 times a day.             gabapentin (NEURONTIN) 800 MG tablet  Take 800 mg by mouth 3 times a day.             levothyroxine (SYNTHROID) 125 MCG Tab  Take 1 Tab by mouth Every morning on an empty stomach.             pregabalin (LYRICA) 50 MG capsule  Take 50 mg by mouth 3 times a day.             tamsulosin (FLOMAX) 0.4 MG capsule  Take 1 Cap by mouth ONE-HALF HOUR AFTER BREAKFAST.             terazosin (HYTRIN) 10 MG capsule  Take 10 mg by mouth every bedtime.                 DIET  Orders Placed This Encounter   Procedures   • Diet Order     Standing Status:   Standing     Number of Occurrences:   1     Order Specific Question:   Diet:     Answer:   Regular [1]       ACTIVITY  As tolerated.  Weight bearing as tolerated      CONSULTATIONS  None    PROCEDURES  None    LABORATORY  Lab Results   Component Value Date/Time    SODIUM 138 01/09/2018 02:51 AM    POTASSIUM 4.0 01/09/2018 02:51 AM    CHLORIDE 108 01/09/2018 02:51 AM    CO2 23 01/09/2018 02:51 AM    GLUCOSE 88 01/09/2018 02:51 AM    BUN 11 01/09/2018 02:51 AM    CREATININE 0.63 01/09/2018 02:51 AM        Lab Results   Component Value Date/Time    WBC 11.1 (H) 01/09/2018 02:51 AM    HEMOGLOBIN 10.8 (L) 01/09/2018 02:51 AM    HEMATOCRIT 32.7 (L) 01/09/2018 02:51 AM    PLATELETCT 171 01/09/2018 02:51 AM        Total time of the discharge process exceeds 38 minutes

## 2018-01-09 NOTE — PROGRESS NOTES
Pt discharged to home PT states they understand discharge instructions with no further question. Tele monitor and PIV removed. All belongings with pt. Discharged via wheelchair with caregiver taking him home.

## 2018-01-09 NOTE — PROGRESS NOTES
[facility] Fall Risk Assessment:     Last Known Fall: Within the last month  Mobility: Hemiplegic, paraplegia, or quadriplegia  Medications: Cardiovascular and central nervous system meds  Mental Status/LOC/Awareness: Awake, alert, and oriented to date, place, and person  Toileting Needs: Use of catheters or diversion devices  Volume/Electrolyte Status: Use of IV fluids/tube feeds  Communication/Sensory: Visual (Glasses)/hearing deficit  Behavior: Appropriate behavior  Faith Parker Fall Risk Total: 16  Fall Risk Level: HIGH RISK    Universal Fall Precautions:  call light/belongings in reach, bed in low position and locked, wheelchairs and assistive devices out of sight, siderails up x 2, use non-slip footwear, adequate lighting, clutter free and spill free environment, educate to call for assistance, educate on level of risk    Fall Risk Level Interventions:     TRIAL (TELE 8, NEURO, MED CHRIS 5) High Fall Risk Interventions  Place yellow fall risk ID band on patient: verified  Provide patient/family education based on risk assessment: completed  Educate patient/family to call staff for assistance when getting out of bed: completed  Place fall precaution signage outside patient door: verified  Place patient in room close to nursing station: completed  Utilize bed/chair fall alarm: verified  Notify charge of high risk for huddle: completed    Patient Specific Interventions:     Medication: review medications with patient and family  Mental Status/LOC/Awareness: reinforce falls education  Toileting: provide frquent toileting  Volume/Electrolyte Status: ensure patient remains hydrated  Communication/Sensory: update plan of care on whiteboard  Behavioral: encourage patient to voice feelings  Mobility: provide comfort measures during transport

## 2018-01-09 NOTE — PROGRESS NOTES
Patient's home medications acquired from pharmacy and given to patient's caregiver to take to patient's home per patient's request.

## 2018-01-09 NOTE — THERAPY
"Occupational Therapy Evaluation completed.   Functional Status: Max A supine > < EOB, mod A squat-pivot transfer, total A LB dressing   Plan of Care: Will benefit from Occupational Therapy 3 times per week  Discharge Recommendations:  Equipment: Will Continue to Assess for Equipment Needs. Post-acute therapy Discharge to home with outpatient or home health for additional skilled therapy services.    See \"Rehab Therapy-Acute\" Patient Summary Report for complete documentation.    62 y.o. male with h/o incomplete quadriplegia, admitted for UTI. Seen now for OT eval to determine if pt is at baseline function. Pt complete LB dressing, supine > EOB, squat-pivot to loaner manual WC. Pt highly tangential throughout session, difficult to direct and keep on task, perseverating on the fact that he needed to mobilize from bed other than his own to WC other than his own. Pt required max A for bed mobility, mod A for squat-pivot. Completed transfer with incremental scoots requiring extra time and energy. Inefficient technique. Unable to generalize skills to new setting. Anticipate he would perform better with his own equipment in home setting. Pt does not internalize education, appears very \"set in his ways\" with regards to task completion. For aforementioned reasons, pt is not a good rehab candidate. He would benefit from HH OT to address function/safety in home. Acute OT will follow pt to maximize functional independence (with focus on functional mobility) in hospital setting.      "

## 2018-01-09 NOTE — DISCHARGE INSTRUCTIONS
Discharge Instructions    Discharged to home by car with relative. Discharged via wheelchair, hospital escort: Refused.  Special equipment needed: Not Applicable    Be sure to schedule a follow-up appointment with your primary care doctor or any specialists as instructed.     Discharge Plan:   Influenza Vaccine Indication: Patient Refuses    I understand that a diet low in cholesterol, fat, and sodium is recommended for good health. Unless I have been given specific instructions below for another diet, I accept this instruction as my diet prescription.   Other diet: Regular      Special Instructions: Sepsis, Adult  Sepsis is a serious infection of your blood or tissues that affects your whole body. The infection that causes sepsis may be bacterial, viral, fungal, or parasitic. Sepsis may be life threatening. Sepsis can cause your blood pressure to drop. This may result in shock. Shock causes your central nervous system and your organs to stop working correctly.   RISK FACTORS  Sepsis can happen in anyone, but it is more likely to happen in people who have weakened immune systems.  SIGNS AND SYMPTOMS   Symptoms of sepsis can include:  · Fever or low body temperature (hypothermia).  · Rapid breathing (hyperventilation).  · Chills.  · Rapid heartbeat (tachycardia).  · Confusion or light-headedness.  · Trouble breathing.  · Urinating much less than usual.  · Cool, clammy skin or red, flushed skin.  · Other problems with the heart, kidneys, or brain.  DIAGNOSIS   Your health care provider will likely do tests to look for an infection, to see if the infection has spread to your blood, and to see how serious your condition is. Tests can include:  · Blood tests, including cultures of your blood.  · Cultures of other fluids from your body, such as:  ¨ Urine.  ¨ Pus from wounds.  ¨ Mucus coughed up from your lungs.  · Urine tests other than cultures.  · X-ray exams or other imaging tests.  TREATMENT   Treatment will begin  with elimination of the source of infection. If your sepsis is likely caused by a bacterial or fungal infection, you will be given antibiotic or antifungal medicines.  You may also receive:  · Oxygen.  · Fluids through an IV tube.  · Medicines to increase your blood pressure.  · A machine to clean your blood (dialysis) if your kidneys fail.  · A machine to help you breathe if your lungs fail.  SEEK IMMEDIATE MEDICAL CARE IF:  You get an infection or develop any of the signs and symptoms of sepsis after surgery or a hospitalization.     This information is not intended to replace advice given to you by your health care provider. Make sure you discuss any questions you have with your health care provider.     Document Released: 09/15/2004 Document Revised: 05/03/2016 Document Reviewed: 08/25/2014  [employer] Interactive Patient Education ©2016 [employer]      · Is patient discharged on Warfarin / Coumadin?   No     · Is patient Post Blood Transfusion?  No    Depression / Suicide Risk    As you are discharged from this [facility] facility, it is important to learn how to keep safe from harming yourself.    Recognize the warning signs:  · Abrupt changes in personality, positive or negative- including increase in energy   · Giving away possessions  · Change in eating patterns- significant weight changes-  positive or negative  · Change in sleeping patterns- unable to sleep or sleeping all the time   · Unwillingness or inability to communicate  · Depression  · Unusual sadness, discouragement and loneliness  · Talk of wanting to die  · Neglect of personal appearance   · Rebelliousness- reckless behavior  · Withdrawal from people/activities they love  · Confusion- inability to concentrate     If you or a loved one observes any of these behaviors or has concerns about self-harm, here's what you can do:  · Talk about it- your feelings and reasons for harming yourself  · Remove any means that you might use to hurt yourself  (examples: pills, rope, extension cords, firearm)  · Get professional help from the community (Mental Health, Substance Abuse, psychological counseling)  · Do not be alone:Call your Safe Contact- someone whom you trust who will be there for you.  · Call your local CRISIS HOTLINE 198-2048 or 815-801-5651  · Call your local Children's Mobile Crisis Response Team [place] (701) 323-2797 or www.[place]  · Call the toll free National Suicide Prevention Hotlines   · National Suicide Prevention Lifeline 212-420-YILD (9553)  · [employer] 800-SUICIDE (578-6744)        Urinary Tract Infection  Urinary tract infections (UTIs) can develop anywhere along your urinary tract. Your urinary tract is your body's drainage system for removing wastes and extra water. Your urinary tract includes two kidneys, two ureters, a bladder, and a urethra. Your kidneys are a pair of bean-shaped organs. Each kidney is about the size of your fist. They are located below your ribs, one on each side of your spine.  CAUSES  Infections are caused by microbes, which are microscopic organisms, including fungi, viruses, and bacteria. These organisms are so small that they can only be seen through a microscope. Bacteria are the microbes that most commonly cause UTIs.  SYMPTOMS   Symptoms of UTIs may vary by age and gender of the patient and by the location of the infection. Symptoms in young women typically include a frequent and intense urge to urinate and a painful, burning feeling in the bladder or urethra during urination. Older women and men are more likely to be tired, shaky, and weak and have muscle aches and abdominal pain. A fever may mean the infection is in your kidneys. Other symptoms of a kidney infection include pain in your back or sides below the ribs, nausea, and vomiting.  DIAGNOSIS  To diagnose a UTI, your caregiver will ask you about your symptoms. Your caregiver also will ask to provide a urine sample. The  urine sample will be tested for bacteria and white blood cells. White blood cells are made by your body to help fight infection.  TREATMENT   Typically, UTIs can be treated with medication. Because most UTIs are caused by a bacterial infection, they usually can be treated with the use of antibiotics. The choice of antibiotic and length of treatment depend on your symptoms and the type of bacteria causing your infection.  HOME CARE INSTRUCTIONS  · If you were prescribed antibiotics, take them exactly as your caregiver instructs you. Finish the medication even if you feel better after you have only taken some of the medication.  · Drink enough water and fluids to keep your urine clear or pale yellow.  · Avoid caffeine, tea, and carbonated beverages. They tend to irritate your bladder.  · Empty your bladder often. Avoid holding urine for long periods of time.  · Empty your bladder before and after sexual intercourse.  · After a bowel movement, women should cleanse from front to back. Use each tissue only once.  SEEK MEDICAL CARE IF:   · You have back pain.  · You develop a fever.  · Your symptoms do not begin to resolve within 3 days.  SEEK IMMEDIATE MEDICAL CARE IF:   · You have severe back pain or lower abdominal pain.  · You develop chills.  · You have nausea or vomiting.  · You have continued burning or discomfort with urination.  MAKE SURE YOU:   · Understand these instructions.  · Will watch your condition.  · Will get help right away if you are not doing well or get worse.     This information is not intended to replace advice given to you by your health care provider. Make sure you discuss any questions you have with your health care provider.     Document Released: 09/27/2006 Document Revised: 01/08/2016 Document Reviewed: 01/25/2013  [employer] Interactive Patient Education ©2016 [employer]

## 2018-01-09 NOTE — CARE PLAN
Problem: Safety  Goal: Will remain free from falls    Intervention: Implement fall precautions  Faith Parker Fall Risk Assessment:     Last Known Fall: Within the last month  Mobility: Hemiplegic, paraplegia, or quadriplegia  Medications: Cardiovascular and central nervous system meds  Mental Status/LOC/Awareness: Awake, alert, and oriented to date, place, and person  Toileting Needs: Use of catheters or diversion devices  Volume/Electrolyte Status: Use of IV fluids/tube feeds  Communication/Sensory: Visual (Glasses)/hearing deficit  Behavior: Appropriate behavior  Faith Parker Fall Risk Total: 16  Fall Risk Level: HIGH RISK    Universal Fall Precautions:  call light/belongings in reach, bed in low position and locked, siderails up x 2, educate to call for assistance    Fall Risk Level Interventions:     TRIAL (TELE 8, NEURO, MED CHRIS 5) High Fall Risk Interventions  Place yellow fall risk ID band on patient: verified  Provide patient/family education based on risk assessment: completed  Educate patient/family to call staff for assistance when getting out of bed: completed  Place fall precaution signage outside patient door: verified  Place patient in room close to nursing station: completed  Utilize bed/chair fall alarm: verified  Notify charge of high risk for huddle: completed    Patient Specific Interventions:     Medication: review medications with patient and family and limit combination of prn medications  Mental Status/LOC/Awareness: reinforce falls education, check on patient hourly, utilize bed/chair fall alarm and reinforce the use of call light  Toileting: provide frquent toileting and instruct patient/family on the need to call for assistance when toileting  Volume/Electrolyte Status: ensure patient remains hydrated and monitor abnormal lab values  Communication/Sensory: update plan of care on whiteboard  Behavioral: engage patient in daily activities, administer medication as ordered and  instruct/reinforce fall program rationale  Mobility: utilize bed/chair fall alarm, ensure bed is locked and in lowest position, instruct patient to exit bed on their strongest side and collaborate with doctor for possible PT/OT consult      Problem: Skin Integrity  Goal: Risk for impaired skin integrity will decrease  Outcome: PROGRESSING AS EXPECTED  Pt has been turned q2h and on waffle mattress

## 2018-01-09 NOTE — PROGRESS NOTES
Assumed pt care at 1900, bedside report received. Pt in no distress.  Pt denies any chest pain or SOB.  Complaining of neck discomfort, pt currently refusing any pain medication.  No further complaints.  Bed locked in lowest position and call light within reach.  Will continue to monitor and follow plan of care.

## 2018-01-09 NOTE — DISCHARGE PLANNING
Per RN, pt's caregiver can pick him up and provide transport home. No additional needs at this time. Pt to DC home today.

## 2018-01-09 NOTE — PROGRESS NOTES
At 2250, pt had 12beat run of Vtach, pt asleep denies any complaints.  Informed, hospitalist Dr Rahman of vtach and pt's potassium of 3.2, and no mag was checked, per MD lewis pt 40meq of potassium PO and check mag level with morning labs.  Will continue to monitor and follow plan of care.

## 2018-01-10 ENCOUNTER — HOSPITAL ENCOUNTER (EMERGENCY)
Facility: MEDICAL CENTER | Age: 63
End: 2018-01-11
Attending: EMERGENCY MEDICINE
Payer: MEDICARE

## 2018-01-10 DIAGNOSIS — R30.0 DYSURIA: ICD-10-CM

## 2018-01-10 LAB
ALBUMIN SERPL BCP-MCNC: 3.3 G/DL (ref 3.2–4.9)
ALBUMIN/GLOB SERPL: 0.9 G/DL
ALP SERPL-CCNC: 71 U/L (ref 30–99)
ALT SERPL-CCNC: 34 U/L (ref 2–50)
ANION GAP SERPL CALC-SCNC: 8 MMOL/L (ref 0–11.9)
AST SERPL-CCNC: 33 U/L (ref 12–45)
BASOPHILS # BLD AUTO: 0.4 % (ref 0–1.8)
BASOPHILS # BLD: 0.03 K/UL (ref 0–0.12)
BILIRUB SERPL-MCNC: 0.3 MG/DL (ref 0.1–1.5)
BUN SERPL-MCNC: 14 MG/DL (ref 8–22)
CALCIUM SERPL-MCNC: 8.9 MG/DL (ref 8.5–10.5)
CHLORIDE SERPL-SCNC: 102 MMOL/L (ref 96–112)
CO2 SERPL-SCNC: 25 MMOL/L (ref 20–33)
CREAT SERPL-MCNC: 0.42 MG/DL (ref 0.5–1.4)
EOSINOPHIL # BLD AUTO: 0.1 K/UL (ref 0–0.51)
EOSINOPHIL NFR BLD: 1.4 % (ref 0–6.9)
ERYTHROCYTE [DISTWIDTH] IN BLOOD BY AUTOMATED COUNT: 44.7 FL (ref 35.9–50)
GLOBULIN SER CALC-MCNC: 3.5 G/DL (ref 1.9–3.5)
GLUCOSE SERPL-MCNC: 97 MG/DL (ref 65–99)
HCT VFR BLD AUTO: 33.5 % (ref 42–52)
HGB BLD-MCNC: 11.4 G/DL (ref 14–18)
IMM GRANULOCYTES # BLD AUTO: 0.06 K/UL (ref 0–0.11)
IMM GRANULOCYTES NFR BLD AUTO: 0.8 % (ref 0–0.9)
LIPASE SERPL-CCNC: 33 U/L (ref 11–82)
LYMPHOCYTES # BLD AUTO: 0.93 K/UL (ref 1–4.8)
LYMPHOCYTES NFR BLD: 12.7 % (ref 22–41)
MCH RBC QN AUTO: 30.7 PG (ref 27–33)
MCHC RBC AUTO-ENTMCNC: 34 G/DL (ref 33.7–35.3)
MCV RBC AUTO: 90.3 FL (ref 81.4–97.8)
MONOCYTES # BLD AUTO: 0.71 K/UL (ref 0–0.85)
MONOCYTES NFR BLD AUTO: 9.7 % (ref 0–13.4)
NEUTROPHILS # BLD AUTO: 5.49 K/UL (ref 1.82–7.42)
NEUTROPHILS NFR BLD: 75 % (ref 44–72)
NRBC # BLD AUTO: 0 K/UL
NRBC BLD-RTO: 0 /100 WBC
PLATELET # BLD AUTO: 212 K/UL (ref 164–446)
PMV BLD AUTO: 8.5 FL (ref 9–12.9)
POTASSIUM SERPL-SCNC: 4 MMOL/L (ref 3.6–5.5)
PROT SERPL-MCNC: 6.8 G/DL (ref 6–8.2)
RBC # BLD AUTO: 3.71 M/UL (ref 4.7–6.1)
SODIUM SERPL-SCNC: 135 MMOL/L (ref 135–145)
WBC # BLD AUTO: 7.3 K/UL (ref 4.8–10.8)

## 2018-01-10 PROCEDURE — 99285 EMERGENCY DEPT VISIT HI MDM: CPT

## 2018-01-10 PROCEDURE — 83690 ASSAY OF LIPASE: CPT

## 2018-01-10 PROCEDURE — 80053 COMPREHEN METABOLIC PANEL: CPT

## 2018-01-10 PROCEDURE — 85025 COMPLETE CBC W/AUTO DIFF WBC: CPT

## 2018-01-10 PROCEDURE — 36415 COLL VENOUS BLD VENIPUNCTURE: CPT

## 2018-01-10 ASSESSMENT — PAIN SCALES - GENERAL: PAINLEVEL_OUTOF10: 10

## 2018-01-11 VITALS
OXYGEN SATURATION: 93 % | HEIGHT: 69 IN | HEART RATE: 59 BPM | BODY MASS INDEX: 28.88 KG/M2 | RESPIRATION RATE: 49 BRPM | SYSTOLIC BLOOD PRESSURE: 164 MMHG | TEMPERATURE: 97.8 F | DIASTOLIC BLOOD PRESSURE: 100 MMHG | WEIGHT: 195 LBS

## 2018-01-11 RX ORDER — ONDANSETRON 4 MG/1
4 TABLET, ORALLY DISINTEGRATING ORAL ONCE
Status: DISCONTINUED | OUTPATIENT
Start: 2018-01-11 | End: 2018-01-11

## 2018-01-11 RX ORDER — HYDROMORPHONE HYDROCHLORIDE 2 MG/ML
0.5 INJECTION, SOLUTION INTRAMUSCULAR; INTRAVENOUS; SUBCUTANEOUS ONCE
Status: DISCONTINUED | OUTPATIENT
Start: 2018-01-11 | End: 2018-01-11

## 2018-01-11 NOTE — ED NOTES
Assisted pt with condom cath.  Pt had condom cath on.  Attached collection device.  Pt able to urinate small amount.  Pt back to lobby.  Unable to collect urine secondary to small amount of urine produced.

## 2018-01-11 NOTE — ED NOTES
Pt voiding 200 mL urine since arrival in triage.  Pt refuses straight cath because he is voiding.  Social work contacted to assist with pt d/c

## 2018-01-11 NOTE — ED NOTES
Pt ambulatory  Vital signs stable  Pt handed d/c paperwork with understanding stated  Pt states will follow up with PCP or return to er  Pt picked up by caregiver

## 2018-01-11 NOTE — ED NOTES
"Agree with triage rn    Chief Complaint   Patient presents with   • Urinary Retention     Last urination \"a few hours ago\". D/c'd yesterday, hx of enlarged prostate.   • Abdominal Pain     Pt has 100 mL in new urine bag  "

## 2018-01-11 NOTE — ED PROVIDER NOTES
"ED Provider Note    CHIEF COMPLAINT  Chief Complaint   Patient presents with   • Urinary Retention     Last urination \"a few hours ago\". D/c'd yesterday, hx of enlarged prostate.   • Abdominal Pain        [facility]    Primary care provider: AALIYAH Parsons   History obtained from: Patient  History limited by: None     Jesus Pina is a 62 y.o. male who presents to the ED complaining of difficulty with urination. He has a condom catheter in place. Patient states that he was discharged from the hospital yesterday and was started on Flomax for his enlarged prostate and antibiotics for UTI. He feels like he is not producing much urine and feels like he is \"heating up down there.\" He denies fevers/sweats/chills/nausea/vomiting. He had a normal bowel movement last night. 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 He states that he felt he should have stayed longer in the hospital and was given the option to stay but he was feeling pretty good and decided to leave.    REVIEW OF SYSTEMS  Please see HPI for pertinent positives/negatives.  All other systems reviewed and are negative.     PAST MEDICAL HISTORY  Past Medical History:   Diagnosis Date   • BPH (benign prostatic hyperplasia)    • Heart attack     age early 40s    • Muscle disorder     high functioning quadraplegic    • Neck fracture (CMS-HCC)    • Thyroid disease         SURGICAL HISTORY  Past Surgical History:   Procedure Laterality Date   • FULL THICKNESS SKIN GRAFT  2000    foot   • LAMINOTOMY      repair neck fracture: total of 3 surgeries         SOCIAL HISTORY  Social History     Social History Main Topics   • Smoking status: Never Smoker   • Smokeless tobacco: Never Used   • Alcohol use No   • Drug use:      Types: Marijuana      Comment: states he has used marijuana snacks in the past month    • Sexual activity: No        FAMILY HISTORY  Family History   Problem Relation Age of Onset   • Cancer Mother      gallbladder   • Cancer Father      " "colon and prostate   • Stroke Father    • Heart Attack Neg Hx         CURRENT MEDICATIONS  Home Medications     Reviewed by Agustin Hurst R.N. (Registered Nurse) on 01/11/18 at 0024  Med List Status: Not Addressed   Medication Last Dose Status   baclofen (LIORESAL) 20 MG tablet 1/7/2018 Active   bethanechol (URECHOLINE) 25 MG Tab 1/7/2018 Active   ciprofloxacin (CIPRO) 500 MG Tab  Active   docusate sodium (STOOL SOFTENER) 100 MG Cap 1/7/2018 Active   gabapentin (NEURONTIN) 800 MG tablet 1/7/2018 Active   levothyroxine (SYNTHROID) 125 MCG Tab 1/7/2018 Active   pregabalin (LYRICA) 50 MG capsule 1/6/2018 Active   tamsulosin (FLOMAX) 0.4 MG capsule  Active   terazosin (HYTRIN) 10 MG capsule 1/6/2018 Active                 ALLERGIES  Allergies   Allergen Reactions   • Erythromycin Unspecified     \"It chokes me\"   • Pcn [Penicillins] Rash     Develops a rash with some PCN derivatives.    • Tdap [Dipth, Acell Pertus, Tetanus] Swelling     Neck Swelling        PHYSICAL EXAM  VITAL SIGNS: BP (!) 164/100   Pulse (!) 59   Temp 36.6 °C (97.8 °F)   Resp (!) 49   Ht 1.753 m (5' 9\")   Wt 88.5 kg (195 lb)   SpO2 93%   BMI 28.80 kg/m²  @JOSEFINA[[id number]::@     Pulse ox interpretation: 93% I interpret this pulse ox as normal     Constitutional: Well developed, well nourished, alert in no apparent distress, nontoxic appearance    HENT: No external signs of trauma, normocephalic, oropharynx moist and clear, nose normal   Eyes: PERRL, conjunctiva without erythema, no discharge, no icterus    Neck: Soft and supple, trachea midline, no stridor, no tenderness, no LAD, no JVD   Cardiovascular: Bradycardia, no murmurs/rubs/gallops, strong distal pulses and good perfusion    Thorax & Lungs: No respiratory distress, CTAB  Abdomen: Soft, nontender, nondistended, no guarding, no rebound, normal BS    : NEMG, circumcised, testis descended bilaterally and nontender, no hernia/rash/lesions/discharge/LAD, condom catheter in place  Back: " No CVAT    Extremities: No cyanosis, no edema, intact distal pulses with brisk cap refill    Skin: Warm, dry, no pallor/cyanosis, no rash noted    Lymphatic: No lymphadenopathy noted    Neuro: A/O times 3, patient with history of quadriplegia but able to move all 4 extremities but with limitations and decreased strength  Psychiatric: Cooperative        DIAGNOSTIC STUDIES / PROCEDURES        LABS  All labs reviewed by me.     Results for orders placed or performed during the hospital encounter of 01/10/18   CBC WITH DIFFERENTIAL   Result Value Ref Range    WBC 7.3 4.8 - 10.8 K/uL    RBC 3.71 (L) 4.70 - 6.10 M/uL    Hemoglobin 11.4 (L) 14.0 - 18.0 g/dL    Hematocrit 33.5 (L) 42.0 - 52.0 %    MCV 90.3 81.4 - 97.8 fL    MCH 30.7 27.0 - 33.0 pg    MCHC 34.0 33.7 - 35.3 g/dL    RDW 44.7 35.9 - 50.0 fL    Platelet Count 212 164 - 446 K/uL    MPV 8.5 (L) 9.0 - 12.9 fL    Neutrophils-Polys 75.00 (H) 44.00 - 72.00 %    Lymphocytes 12.70 (L) 22.00 - 41.00 %    Monocytes 9.70 0.00 - 13.40 %    Eosinophils 1.40 0.00 - 6.90 %    Basophils 0.40 0.00 - 1.80 %    Immature Granulocytes 0.80 0.00 - 0.90 %    Nucleated RBC 0.00 /100 WBC    Neutrophils (Absolute) 5.49 1.82 - 7.42 K/uL    Lymphs (Absolute) 0.93 (L) 1.00 - 4.80 K/uL    Monos (Absolute) 0.71 0.00 - 0.85 K/uL    Eos (Absolute) 0.10 0.00 - 0.51 K/uL    Baso (Absolute) 0.03 0.00 - 0.12 K/uL    Immature Granulocytes (abs) 0.06 0.00 - 0.11 K/uL    NRBC (Absolute) 0.00 K/uL   COMP METABOLIC PANEL   Result Value Ref Range    Sodium 135 135 - 145 mmol/L    Potassium 4.0 3.6 - 5.5 mmol/L    Chloride 102 96 - 112 mmol/L    Co2 25 20 - 33 mmol/L    Anion Gap 8.0 0.0 - 11.9    Glucose 97 65 - 99 mg/dL    Bun 14 8 - 22 mg/dL    Creatinine 0.42 (L) 0.50 - 1.40 mg/dL    Calcium 8.9 8.5 - 10.5 mg/dL    AST(SGOT) 33 12 - 45 U/L    ALT(SGPT) 34 2 - 50 U/L    Alkaline Phosphatase 71 30 - 99 U/L    Total Bilirubin 0.3 0.1 - 1.5 mg/dL    Albumin 3.3 3.2 - 4.9 g/dL    Total Protein 6.8 6.0 -  8.2 g/dL    Globulin 3.5 1.9 - 3.5 g/dL    A-G Ratio 0.9 g/dL   LIPASE   Result Value Ref Range    Lipase 33 11 - 82 U/L   ESTIMATED GFR   Result Value Ref Range    GFR If African American >60 >60 mL/min/1.73 m 2    GFR If Non African American >60 >60 mL/min/1.73 m 2        RADIOLOGY  The radiologist's interpretation of all radiological studies have been reviewed by me.     No orders to display          COURSE & MEDICAL DECISION MAKING  Nursing notes, VS, PMSFHx reviewed in chart.     Review of past medical records shows the patient with recent hospitalization for UTI as well as weakness due to fall.    Differential diagnoses considered include but are not limited to: UTI/cystitis/pyelo, prostatitis, KS/renal colic, urethritis, STI, urinary retention, cancer     Patient presents to the ED with above complaint. Labs were essentially unremarkable with anemia that appears stable compared to previous. Bladder scan showed slightly more than 300 mL of urine. Patient declined Urena catheter or straight cath. He was noted to be able to produce urine through his condom catheter. He is otherwise noted to be in no acute distress and nontoxic in appearance. Vital signs are remarkable for slight bradycardia as well as high blood pressure for which he will need outpatient follow-up for further management. Patient was advised to continue with antibiotics and Flomax. At this point there does not appear to be any signs of significant urinary retention or obstruction. I discussed with him worrisome signs and symptoms and he was given return to ED precautions. He was advised on outpatient follow-up with primary care provider and urology. He verbalized understanding and agreed with plan of care with no further questions or concerns.      The patient is referred to a primary physician for blood pressure management, diabetic screening, and for all other preventative health concerns.       FINAL IMPRESSION  1. Dysuria            DISPOSITION  Patient will be discharged home in stable condition.       FOLLOW UP  Bang Keane A.P.R.N.  [address] [id number] [phone]    Call today      [facility], Emergency Dept  [address] [facility] [address] [phone]    If symptoms worsen         OUTPATIENT MEDICATIONS  Discharge Medication List as of 1/11/2018  2:41 AM             Electronically signed by: Roge Srinivasan, 1/11/2018 9:12 AM      Portions of this record were made with voice recognition software.  Despite my review, spelling/grammar/context errors may still remain.  Interpretation of this chart should be taken in this context.

## 2018-01-11 NOTE — ED NOTES
Per MD, straight cath pt and replace pt condom catheter.  Pt refuses internal cath.  Pt attempting to contact care taker in order to put new condom catheter on.

## 2018-01-11 NOTE — ED NOTES
"Chief Complaint   Patient presents with   • Urinary Retention     Last urination \"a few hours ago\". D/c'd yesterday, hx of enlarged prostate.   • Abdominal Pain     Pt to triage via w/c. Pt is a quadriplegic. States \"I was discharged too soon and I am hurting everywhere, especially my stomach\". States care giver is here but not located with pt in triage. BP noted. Other VSS. Reports chills at home and \"I'm septic\".   Pt to [facility] to. Educated on triage process and to inform staff of any changes.     BP (!) 164/100   Pulse 63   Temp 36.6 °C (97.8 °F)   Resp 16   Ht 1.753 m (5' 9\")   Wt 88.5 kg (195 lb)   SpO2 97%   BMI 28.80 kg/m²       "

## 2018-01-11 NOTE — DISCHARGE PLANNING
Medical Social Work  SW asked to assist with transport home.  YE able to contact care giver Vianca Menard at [phone]  She should be here by 0300  RN notified

## 2018-01-12 LAB
BACTERIA BLD CULT: NORMAL
BACTERIA BLD CULT: NORMAL
SIGNIFICANT IND 70042: NORMAL
SIGNIFICANT IND 70042: NORMAL
SITE SITE: NORMAL
SITE SITE: NORMAL
SOURCE SOURCE: NORMAL
SOURCE SOURCE: NORMAL

## 2018-01-20 LAB — EKG IMPRESSION: NORMAL

## 2018-03-13 DIAGNOSIS — E03.4 HYPOTHYROIDISM DUE TO ACQUIRED ATROPHY OF THYROID: ICD-10-CM

## 2018-03-13 RX ORDER — TERAZOSIN 10 MG/1
10 CAPSULE ORAL
Qty: 90 CAP | Refills: 2 | Status: SHIPPED | OUTPATIENT
Start: 2018-03-13

## 2018-03-13 RX ORDER — LEVOTHYROXINE SODIUM 0.12 MG/1
125 TABLET ORAL
Qty: 90 TAB | Refills: 2 | Status: SHIPPED | OUTPATIENT
Start: 2018-03-13

## 2018-03-13 NOTE — TELEPHONE ENCOUNTER
Requested Prescriptions     Signed Prescriptions Disp Refills   • levothyroxine (SYNTHROID) 125 MCG Tab 90 Tab 2     Sig: Take 1 Tab by mouth Every morning on an empty stomach.     Authorizing Provider: ESSIE MILLAN   • terazosin (HYTRIN) 10 MG capsule 90 Cap 2     Sig: Take 1 Cap by mouth every bedtime.     Authorizing Provider: ESSIE MILLAN A.P.R.N.

## 2018-03-28 PROBLEM — N39.0 COMPLICATED UTI (URINARY TRACT INFECTION): Status: RESOLVED | Noted: 2018-01-07 | Resolved: 2018-03-28

## 2018-03-28 PROBLEM — R33.9 URINARY RETENTION: Status: ACTIVE | Noted: 2018-03-28

## 2018-03-28 PROBLEM — N31.9 NEUROGENIC BLADDER: Status: ACTIVE | Noted: 2018-03-28

## 2018-04-25 RX ORDER — BETHANECHOL CHLORIDE 25 MG/1
25 TABLET ORAL 3 TIMES DAILY
Qty: 270 TAB | Refills: 3 | Status: SHIPPED | OUTPATIENT
Start: 2018-04-25

## 2021-03-03 DIAGNOSIS — Z23 NEED FOR VACCINATION: ICD-10-CM

## 2022-11-15 ENCOUNTER — APPOINTMENT (OUTPATIENT)
Dept: RADIOLOGY | Facility: MEDICAL CENTER | Age: 67
End: 2022-11-15
Attending: EMERGENCY MEDICINE
Payer: MEDICARE

## 2022-11-15 ENCOUNTER — HOSPITAL ENCOUNTER (EMERGENCY)
Facility: MEDICAL CENTER | Age: 67
End: 2022-11-15
Attending: EMERGENCY MEDICINE
Payer: MEDICARE

## 2022-11-15 VITALS
HEIGHT: 69 IN | OXYGEN SATURATION: 93 % | TEMPERATURE: 97.2 F | DIASTOLIC BLOOD PRESSURE: 84 MMHG | BODY MASS INDEX: 25.92 KG/M2 | HEART RATE: 54 BPM | SYSTOLIC BLOOD PRESSURE: 181 MMHG | WEIGHT: 175 LBS | RESPIRATION RATE: 18 BRPM

## 2022-11-15 DIAGNOSIS — L89.90 PRESSURE INJURY OF SKIN, UNSPECIFIED INJURY STAGE, UNSPECIFIED LOCATION: ICD-10-CM

## 2022-11-15 LAB
ALBUMIN SERPL BCP-MCNC: 3.9 G/DL (ref 3.2–4.9)
ALBUMIN/GLOB SERPL: 0.9 G/DL
ALP SERPL-CCNC: 77 U/L (ref 30–99)
ALT SERPL-CCNC: 14 U/L (ref 2–50)
ANION GAP SERPL CALC-SCNC: 10 MMOL/L (ref 7–16)
AST SERPL-CCNC: 20 U/L (ref 12–45)
BASOPHILS # BLD AUTO: 0.5 % (ref 0–1.8)
BASOPHILS # BLD: 0.03 K/UL (ref 0–0.12)
BILIRUB SERPL-MCNC: 0.3 MG/DL (ref 0.1–1.5)
BUN SERPL-MCNC: 14 MG/DL (ref 8–22)
CALCIUM SERPL-MCNC: 9.7 MG/DL (ref 8.5–10.5)
CHLORIDE SERPL-SCNC: 97 MMOL/L (ref 96–112)
CO2 SERPL-SCNC: 26 MMOL/L (ref 20–33)
CREAT SERPL-MCNC: 0.65 MG/DL (ref 0.5–1.4)
EOSINOPHIL # BLD AUTO: 0.2 K/UL (ref 0–0.51)
EOSINOPHIL NFR BLD: 3.3 % (ref 0–6.9)
ERYTHROCYTE [DISTWIDTH] IN BLOOD BY AUTOMATED COUNT: 47.8 FL (ref 35.9–50)
GFR SERPLBLD CREATININE-BSD FMLA CKD-EPI: 103 ML/MIN/1.73 M 2
GLOBULIN SER CALC-MCNC: 4.3 G/DL (ref 1.9–3.5)
GLUCOSE SERPL-MCNC: 90 MG/DL (ref 65–99)
HCT VFR BLD AUTO: 36.3 % (ref 42–52)
HGB BLD-MCNC: 11.6 G/DL (ref 14–18)
IMM GRANULOCYTES # BLD AUTO: 0.02 K/UL (ref 0–0.11)
IMM GRANULOCYTES NFR BLD AUTO: 0.3 % (ref 0–0.9)
LYMPHOCYTES # BLD AUTO: 1.05 K/UL (ref 1–4.8)
LYMPHOCYTES NFR BLD: 17.3 % (ref 22–41)
MCH RBC QN AUTO: 29.4 PG (ref 27–33)
MCHC RBC AUTO-ENTMCNC: 32 G/DL (ref 33.7–35.3)
MCV RBC AUTO: 91.9 FL (ref 81.4–97.8)
MONOCYTES # BLD AUTO: 0.48 K/UL (ref 0–0.85)
MONOCYTES NFR BLD AUTO: 7.9 % (ref 0–13.4)
NEUTROPHILS # BLD AUTO: 4.29 K/UL (ref 1.82–7.42)
NEUTROPHILS NFR BLD: 70.7 % (ref 44–72)
NRBC # BLD AUTO: 0 K/UL
NRBC BLD-RTO: 0 /100 WBC
PLATELET # BLD AUTO: 216 K/UL (ref 164–446)
PMV BLD AUTO: 8.3 FL (ref 9–12.9)
POTASSIUM SERPL-SCNC: 4.4 MMOL/L (ref 3.6–5.5)
PROT SERPL-MCNC: 8.2 G/DL (ref 6–8.2)
RBC # BLD AUTO: 3.95 M/UL (ref 4.7–6.1)
SODIUM SERPL-SCNC: 133 MMOL/L (ref 135–145)
WBC # BLD AUTO: 6.1 K/UL (ref 4.8–10.8)

## 2022-11-15 PROCEDURE — A9270 NON-COVERED ITEM OR SERVICE: HCPCS | Performed by: EMERGENCY MEDICINE

## 2022-11-15 PROCEDURE — 36415 COLL VENOUS BLD VENIPUNCTURE: CPT

## 2022-11-15 PROCEDURE — 99284 EMERGENCY DEPT VISIT MOD MDM: CPT

## 2022-11-15 PROCEDURE — 80053 COMPREHEN METABOLIC PANEL: CPT

## 2022-11-15 PROCEDURE — 700102 HCHG RX REV CODE 250 W/ 637 OVERRIDE(OP): Performed by: EMERGENCY MEDICINE

## 2022-11-15 PROCEDURE — 73660 X-RAY EXAM OF TOE(S): CPT | Mod: LT

## 2022-11-15 PROCEDURE — 73600 X-RAY EXAM OF ANKLE: CPT | Mod: LT

## 2022-11-15 PROCEDURE — 71045 X-RAY EXAM CHEST 1 VIEW: CPT

## 2022-11-15 PROCEDURE — 85025 COMPLETE CBC W/AUTO DIFF WBC: CPT

## 2022-11-15 RX ORDER — HYDROCODONE BITARTRATE AND ACETAMINOPHEN 5; 325 MG/1; MG/1
1 TABLET ORAL ONCE
Status: COMPLETED | OUTPATIENT
Start: 2022-11-15 | End: 2022-11-15

## 2022-11-15 RX ORDER — ACETAMINOPHEN 325 MG/1
650 TABLET ORAL ONCE
Status: COMPLETED | OUTPATIENT
Start: 2022-11-15 | End: 2022-11-15

## 2022-11-15 RX ADMIN — ACETAMINOPHEN 650 MG: 325 TABLET, FILM COATED ORAL at 04:03

## 2022-11-15 RX ADMIN — HYDROCODONE BITARTRATE AND ACETAMINOPHEN 1 TABLET: 5; 325 TABLET ORAL at 04:02

## 2022-11-15 NOTE — DISCHARGE INSTRUCTIONS
You are seen in the emergency department for multiple pressure ulcers and wounds on your body.  Thankfully, there is no signs of infection.  Your x-rays and labs were reassuring.  We recommend trying to had to keep pressure off of the wounds.  You are being referred to the wound clinic for follow-up.  Please schedule an appointment for follow-up.    Please return to the emergency department or seek medical attention if you develop:  Fevers, uncontrollable pain, vomiting, difficulty breathing, any other new or concerning findings

## 2022-11-15 NOTE — ED NOTES
Pt brought from [facility] to [facility] via wheelchair and transferred to bed with max assist by 4 staff members. Pt placed in hospital gown and is now resting in bed with even and unlabored breaths, in no apparent distress at this time. Will continue to monitor pt while awaiting orders.

## 2022-11-15 NOTE — ED NOTES
Pt resting in bed and dozing intermittently with even and unlabored breaths, in no acute distress. Pt updated regarding test results and status waiting for further results, demonstrated understanding. Pt denies any complaints or needs at this time. Will continue to monitor.

## 2022-11-15 NOTE — ED PROVIDER NOTES
ED Provider Note    Scribed for Prince Simpson M.D. by Agustin Montanez. 11/15/2022,  3:49 AM.    Means of Arrival: walk in  History obtained from: patient  History limited by: none    CHIEF COMPLAINT  Chief Complaint   Patient presents with    Scrotal Pain     Was in nursing home, states he has multiple pressure ulcers in the groin area    Wound Check     Wound to L ankle, states he has had it for months.  Brought into hospital by old caregiver.  States he has little to no support at home.       HPI  Jesus Pina is a 66 y.o. male who presents to the Emergency Department by his old caregiver for a wound check to his left ankle. He states that he has had these wound for months. He also has been complaining of multiple pressure ulcers to his groin area and some pain behind his left ribs. He states that this pain behind his ribs isn't new as he has had cramps behind that area for a while. He sates that he has been previously medicated with morphine for pain, endorses not taking anything tonight. He has little to no mobility because he broke his neck twice, and now requires 24/7 care. Patient states desire to walk again using a walker and to ride his three wheeled motorcycle.    REVIEW OF SYSTEMS  RESPIRATORY:  Left rib pain.  GENITOURINARY:   Inguinal pain.  MUSCULOSKELETAL:  Left ankle pain.  SKIN:  Wound to the left ankle, ulcers to scrotal area.  See HPI for further details.   All other systems are negative.     PAST MEDICAL HISTORY  Past Medical History:   Diagnosis Date    BPH (benign prostatic hyperplasia)     Heart attack (HCC)     age early 40s     Muscle disorder     high functioning quadraplegic     Neck fracture (HCC)     Thyroid disease        FAMILY HISTORY  Family History   Problem Relation Age of Onset    Cancer Mother         gallbladder    Cancer Father         colon and prostate    Stroke Father     Heart Attack Neg Hx        SOCIAL HISTORY   reports that he has never smoked. He has never used smokeless  "tobacco. He reports current drug use. Drug: Marijuana. He reports that he does not drink alcohol.    SURGICAL HISTORY  Past Surgical History:   Procedure Laterality Date    FULL THICKNESS SKIN GRAFT  2000    foot    LAMINOTOMY      repair neck fracture: total of 3 surgeries        CURRENT MEDICATIONS  Home Medications       Reviewed by Brittany Salamanca R.N. (Registered Nurse) on 11/15/22 at 0312  Med List Status: Partial     Medication Last Dose Status   baclofen (LIORESAL) 20 MG tablet  Active   bethanechol (URECHOLINE) 25 MG Tab  Active   docusate sodium (STOOL SOFTENER) 100 MG Cap  Active   gabapentin (NEURONTIN) 800 MG tablet  Active   levothyroxine (SYNTHROID) 125 MCG Tab  Active   pregabalin (LYRICA) 50 MG capsule  Active   tamsulosin (FLOMAX) 0.4 MG capsule  Active   terazosin (HYTRIN) 10 MG capsule  Active                    ALLERGIES  Allergies   Allergen Reactions    Erythromycin Unspecified     \"It chokes me\"    Pcn [Penicillins] Rash     Develops a rash with some PCN derivatives.     Tdap [Dipth, Acell Pertus, Tetanus] Swelling     Neck Swelling       PHYSICAL EXAM  VITAL SIGNS: BP (!) 141/78   Pulse 63   Temp 36.2 °C (97.2 °F) (Temporal)   Resp 18   Ht 1.753 m (5' 9\")   Wt 79.4 kg (175 lb)   SpO2 93%   BMI 25.84 kg/m²    Gen: Alert, no acute distress  HEENT: ATNC  Eyes: PERRL, EOMI, normal conjunctiva.   Neck: trachea midline  Resp: no respiratory distress  CV: No JVD  Abd: non-distended  : Stage 1 and 2 pressure ulcers, sloughing of skin in gluteal region of perineum.  Ext: Multiple open wounds involving the left knee and left shin. Ulcer to the left great toe and wound to left lateral malleolus. Bruising below right lateral malleolus. No deformities  Psych: normal mood  Neuro: speech fluent     DIAGNOSTIC STUDIES / PROCEDURES      LABS  Labs Reviewed   CBC WITH DIFFERENTIAL - Abnormal; Notable for the following components:       Result Value    RBC 3.95 (*)     Hemoglobin 11.6 (*)     " Hematocrit 36.3 (*)     MCHC 32.0 (*)     MPV 8.3 (*)     Lymphocytes 17.30 (*)     All other components within normal limits   COMP METABOLIC PANEL - Abnormal; Notable for the following components:    Sodium 133 (*)     Globulin 4.3 (*)     All other components within normal limits   ESTIMATED GFR     All labs reviewed by me.    RADIOLOGY  DX-TOE(S) 2+ LEFT   Final Result      1.  There is no radiographic evidence of osteomyelitis.   2.  There is multifocal osteoarthritis.      DX-ANKLE 2- VIEWS LEFT   Final Result      1.  There is no soft tissue gas, focal swelling or radiographic evidence of osteomyelitis.   2.  There is mild multifocal degenerative change throughout the left ankle.      DX-CHEST-PORTABLE (1 VIEW)   Final Result      1.  There is no acute cardiopulmonary process.        The radiologist’s interpretation of all radiology studies have been reviewed by me.    COURSE & MEDICAL DECISION MAKING  Pertinent Labs & Imaging studies reviewed. (See chart for details)    3:49 AM Patient seen and examined at bedside. Patient will be treated with Tylenol 650 mg and Norco 5-325 mg for his symptoms.     5:42 AM - Patient was reevaluated at bedside. Discussed lab and radiology results with the patient and informed them that they are reassuring for no infections. Patient will be discharged at this time. Discussed return precautions and plan for at home care. Patient verbalizes understanding and agreement to this plan of care.        Medical Decision Making:  Patient presents with multiple pressure wounds.  No evidence of infection.  No leukocytosis.  Patient is labs otherwise reassuring, no evidence of diabetes.  The x-rays demonstrate no evidence of osteomyelitis.  Benign abdominal examination, low suspicion for intra-abdominal process.  Chest x-ray negative for pneumonia, pneumothorax, lung masses.  Low suspicion for PE.    The patient does have a caregiver who can care for him.  He is referred to the wound  clinic for follow-up and ongoing wound management.    The patient will return for new or worsening symptoms and is stable at the time of discharge.    The patient is referred to a primary physician for blood pressure management, diabetic screening, and for all other preventative health concerns.    DISPOSITION:  Patient will be discharged home in stable condition.    FOLLOW UP:  AALIYAH Parsons  [address]  [address]  [facility] [address]  [phone]    Schedule an appointment as soon as possible for a visit       [facility], Emergency Dept  [address] [facility] [id number] [phone]    If symptoms worsen    [facility] [place]  [address]  [facility] [address]  [phone]  Schedule an appointment as soon as possible for a visit     FINAL IMPRESSION  1. Pressure injury of skin, unspecified injury stage, unspecified location      Agustin BURNHAM (Scribe), am scribing for, and in the presence of, Prince Simpson M.D..    Electronically signed by: Agustin Montanez (Scribe), 11/15/2022    IPrince M.D. personally performed the services described in this documentation, as scribed by Agustin Montanez in my presence, and it is both accurate and complete.    The note accurately reflects work and decisions made by me.  Prince Simpson M.D.  11/15/2022  5:51 AM    This dictation was created using voice recognition software. The accuracy of the dictation is limited to the abilities of the software. I expect there may be some errors of grammar and possibly content. The nursing notes were reviewed and certain aspects of this information were incorporated into this note.

## 2022-11-15 NOTE — ED TRIAGE NOTES
"Chief Complaint   Patient presents with    Scrotal Pain     Was in nursing home, states he has multiple pressure ulcers in the groin area    Wound Check     Wound to L ankle, states he has had it for months.  Brought into hospital by old caregiver.  States he has little to no support at home.       Pt wheeled to triage, was brought in by old caregiver. Pt A&Ox4, came in for above complaint.  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  Does not want to go back to a SNF.      Pt to lobby . Pt educated on alerting staff in changes to condition. Pt verbalized understanding.     BP (!) 141/78   Pulse 63   Temp 36.2 °C (97.2 °F) (Temporal)   Resp 18   Ht 1.753 m (5' 9\")   Wt 79.4 kg (175 lb)   SpO2 93%   BMI 25.84 kg/m²     "

## 2022-11-15 NOTE — ED NOTES
Pt changed into dry pants and redressed in his clothes and shoes. Pt educated regarding discharge instructions and demonstrated understanding. Pt taking out to lobby via wheelchair where his caregiver Lydia will be picking him up. Pt does not appear to be in any distress at this time. Pt's discharge paperwork and belongings sent home with pt.

## 2022-11-15 NOTE — ED NOTES
Pt presents to room with catheter bag full of urine which was placed in a belongings bag, also full of urine. Upon transfer to bed pt also cleaned of stool incontinence. At this time wounds noted on left buttocks, under scrotum, on left lateral knee, left shin, and left lateral ankle.